# Patient Record
Sex: FEMALE | Race: OTHER | HISPANIC OR LATINO
[De-identification: names, ages, dates, MRNs, and addresses within clinical notes are randomized per-mention and may not be internally consistent; named-entity substitution may affect disease eponyms.]

---

## 2018-02-27 ENCOUNTER — APPOINTMENT (OUTPATIENT)
Dept: SURGERY | Facility: CLINIC | Age: 48
End: 2018-02-27
Payer: SELF-PAY

## 2018-02-27 VITALS
HEIGHT: 60.5 IN | SYSTOLIC BLOOD PRESSURE: 146 MMHG | DIASTOLIC BLOOD PRESSURE: 72 MMHG | WEIGHT: 216.4 LBS | BODY MASS INDEX: 41.39 KG/M2

## 2018-02-27 PROCEDURE — SI006: CPT

## 2018-04-04 ENCOUNTER — APPOINTMENT (OUTPATIENT)
Dept: SURGERY | Facility: CLINIC | Age: 48
End: 2018-04-04
Payer: COMMERCIAL

## 2018-04-04 VITALS
SYSTOLIC BLOOD PRESSURE: 120 MMHG | WEIGHT: 216 LBS | HEIGHT: 60.5 IN | DIASTOLIC BLOOD PRESSURE: 64 MMHG | BODY MASS INDEX: 41.31 KG/M2

## 2018-04-04 DIAGNOSIS — Z87.09 PERSONAL HISTORY OF OTHER DISEASES OF THE RESPIRATORY SYSTEM: ICD-10-CM

## 2018-04-04 DIAGNOSIS — Z80.41 FAMILY HISTORY OF MALIGNANT NEOPLASM OF OVARY: ICD-10-CM

## 2018-04-04 DIAGNOSIS — Z82.49 FAMILY HISTORY OF ISCHEMIC HEART DISEASE AND OTHER DISEASES OF THE CIRCULATORY SYSTEM: ICD-10-CM

## 2018-04-04 PROCEDURE — 99244 OFF/OP CNSLTJ NEW/EST MOD 40: CPT

## 2018-04-05 ENCOUNTER — APPOINTMENT (OUTPATIENT)
Dept: SURGERY | Facility: CLINIC | Age: 48
End: 2018-04-05
Payer: COMMERCIAL

## 2018-04-05 VITALS — HEIGHT: 60.5 IN | WEIGHT: 216 LBS | BODY MASS INDEX: 41.31 KG/M2

## 2018-04-05 PROCEDURE — 97802 MEDICAL NUTRITION INDIV IN: CPT

## 2018-04-05 PROCEDURE — 99212 OFFICE O/P EST SF 10 MIN: CPT

## 2018-05-10 ENCOUNTER — APPOINTMENT (OUTPATIENT)
Dept: SURGERY | Facility: CLINIC | Age: 48
End: 2018-05-10
Payer: COMMERCIAL

## 2018-05-10 VITALS — BODY MASS INDEX: 41.23 KG/M2 | WEIGHT: 215.6 LBS | HEIGHT: 60.5 IN

## 2018-05-10 PROCEDURE — 99212 OFFICE O/P EST SF 10 MIN: CPT

## 2018-06-11 ENCOUNTER — APPOINTMENT (OUTPATIENT)
Dept: SURGERY | Facility: CLINIC | Age: 48
End: 2018-06-11
Payer: COMMERCIAL

## 2018-06-11 VITALS — BODY MASS INDEX: 41.69 KG/M2 | WEIGHT: 218 LBS | HEIGHT: 60.5 IN

## 2018-06-11 PROCEDURE — 99212 OFFICE O/P EST SF 10 MIN: CPT

## 2018-06-11 RX ORDER — FLUTICASONE PROPION/SALMETEROL 500-50 MCG
BLISTER, WITH INHALATION DEVICE INHALATION TWICE DAILY
Refills: 0 | Status: DISCONTINUED | COMMUNITY
End: 2018-06-11

## 2018-07-02 ENCOUNTER — RESULT REVIEW (OUTPATIENT)
Age: 48
End: 2018-07-02

## 2018-07-10 ENCOUNTER — OUTPATIENT (OUTPATIENT)
Dept: OUTPATIENT SERVICES | Facility: HOSPITAL | Age: 48
LOS: 1 days | Discharge: HOME | End: 2018-07-10

## 2018-07-10 ENCOUNTER — APPOINTMENT (OUTPATIENT)
Dept: SURGERY | Facility: CLINIC | Age: 48
End: 2018-07-10
Payer: COMMERCIAL

## 2018-07-10 ENCOUNTER — OTHER (OUTPATIENT)
Age: 48
End: 2018-07-10

## 2018-07-10 VITALS — BODY MASS INDEX: 41.12 KG/M2 | WEIGHT: 215 LBS | HEIGHT: 60.5 IN

## 2018-07-10 PROCEDURE — 99212 OFFICE O/P EST SF 10 MIN: CPT

## 2018-07-16 ENCOUNTER — RESULT REVIEW (OUTPATIENT)
Age: 48
End: 2018-07-16

## 2018-08-07 ENCOUNTER — APPOINTMENT (OUTPATIENT)
Dept: SURGERY | Facility: CLINIC | Age: 48
End: 2018-08-07
Payer: COMMERCIAL

## 2018-08-07 VITALS — HEIGHT: 60.5 IN | BODY MASS INDEX: 41.5 KG/M2 | WEIGHT: 217 LBS

## 2018-08-07 PROCEDURE — 99212 OFFICE O/P EST SF 10 MIN: CPT

## 2018-08-16 ENCOUNTER — OUTPATIENT (OUTPATIENT)
Dept: OUTPATIENT SERVICES | Facility: HOSPITAL | Age: 48
LOS: 1 days | Discharge: HOME | End: 2018-08-16

## 2018-08-16 DIAGNOSIS — F03.90 UNSPECIFIED DEMENTIA WITHOUT BEHAVIORAL DISTURBANCE: ICD-10-CM

## 2018-08-17 DIAGNOSIS — G47.33 OBSTRUCTIVE SLEEP APNEA (ADULT) (PEDIATRIC): ICD-10-CM

## 2018-09-13 ENCOUNTER — APPOINTMENT (OUTPATIENT)
Dept: SURGERY | Facility: CLINIC | Age: 48
End: 2018-09-13
Payer: COMMERCIAL

## 2018-09-13 VITALS — BODY MASS INDEX: 42.46 KG/M2 | WEIGHT: 222 LBS | HEIGHT: 60.5 IN

## 2018-09-13 PROCEDURE — 99212 OFFICE O/P EST SF 10 MIN: CPT

## 2018-09-14 RX ORDER — ALBUTEROL SULFATE 90 UG/1
108 (90 BASE) AEROSOL, METERED RESPIRATORY (INHALATION)
Refills: 0 | Status: ACTIVE | COMMUNITY
Start: 2018-06-11

## 2018-10-08 ENCOUNTER — APPOINTMENT (OUTPATIENT)
Dept: SURGERY | Facility: CLINIC | Age: 48
End: 2018-10-08
Payer: COMMERCIAL

## 2018-10-08 VITALS — BODY MASS INDEX: 42.27 KG/M2 | HEIGHT: 60.5 IN | WEIGHT: 221 LBS

## 2018-10-08 PROCEDURE — 99212 OFFICE O/P EST SF 10 MIN: CPT

## 2018-11-12 ENCOUNTER — RX RENEWAL (OUTPATIENT)
Age: 48
End: 2018-11-12

## 2018-11-30 ENCOUNTER — OUTPATIENT (OUTPATIENT)
Dept: OUTPATIENT SERVICES | Facility: HOSPITAL | Age: 48
LOS: 1 days | Discharge: HOME | End: 2018-11-30

## 2018-11-30 VITALS
RESPIRATION RATE: 16 BRPM | DIASTOLIC BLOOD PRESSURE: 56 MMHG | TEMPERATURE: 97 F | HEART RATE: 63 BPM | HEIGHT: 60 IN | OXYGEN SATURATION: 98 % | SYSTOLIC BLOOD PRESSURE: 117 MMHG | WEIGHT: 220.46 LBS

## 2018-11-30 DIAGNOSIS — Z98.51 TUBAL LIGATION STATUS: Chronic | ICD-10-CM

## 2018-11-30 DIAGNOSIS — Z01.818 ENCOUNTER FOR OTHER PREPROCEDURAL EXAMINATION: ICD-10-CM

## 2018-11-30 DIAGNOSIS — Z90.710 ACQUIRED ABSENCE OF BOTH CERVIX AND UTERUS: Chronic | ICD-10-CM

## 2018-11-30 DIAGNOSIS — E66.01 MORBID (SEVERE) OBESITY DUE TO EXCESS CALORIES: ICD-10-CM

## 2018-11-30 LAB
ALBUMIN SERPL ELPH-MCNC: 4.4 G/DL — SIGNIFICANT CHANGE UP (ref 3.5–5.2)
ALP SERPL-CCNC: 77 U/L — SIGNIFICANT CHANGE UP (ref 30–115)
ALT FLD-CCNC: 15 U/L — SIGNIFICANT CHANGE UP (ref 0–41)
ANION GAP SERPL CALC-SCNC: 14 MMOL/L — SIGNIFICANT CHANGE UP (ref 7–14)
APPEARANCE UR: ABNORMAL
APTT BLD: 31.2 SEC — SIGNIFICANT CHANGE UP (ref 27–39.2)
AST SERPL-CCNC: 16 U/L — SIGNIFICANT CHANGE UP (ref 0–41)
BASOPHILS # BLD AUTO: 0.02 K/UL — SIGNIFICANT CHANGE UP (ref 0–0.2)
BASOPHILS NFR BLD AUTO: 0.3 % — SIGNIFICANT CHANGE UP (ref 0–1)
BILIRUB SERPL-MCNC: 0.2 MG/DL — SIGNIFICANT CHANGE UP (ref 0.2–1.2)
BILIRUB UR-MCNC: NEGATIVE — SIGNIFICANT CHANGE UP
BUN SERPL-MCNC: 10 MG/DL — SIGNIFICANT CHANGE UP (ref 10–20)
CALCIUM SERPL-MCNC: 9.4 MG/DL — SIGNIFICANT CHANGE UP (ref 8.5–10.1)
CHLORIDE SERPL-SCNC: 102 MMOL/L — SIGNIFICANT CHANGE UP (ref 98–110)
CO2 SERPL-SCNC: 27 MMOL/L — SIGNIFICANT CHANGE UP (ref 17–32)
COLOR SPEC: YELLOW — SIGNIFICANT CHANGE UP
CREAT SERPL-MCNC: 0.8 MG/DL — SIGNIFICANT CHANGE UP (ref 0.7–1.5)
DIFF PNL FLD: NEGATIVE — SIGNIFICANT CHANGE UP
EOSINOPHIL # BLD AUTO: 0.11 K/UL — SIGNIFICANT CHANGE UP (ref 0–0.7)
EOSINOPHIL NFR BLD AUTO: 1.8 % — SIGNIFICANT CHANGE UP (ref 0–8)
ESTIMATED AVERAGE GLUCOSE: 103 MG/DL — SIGNIFICANT CHANGE UP (ref 68–114)
GLUCOSE SERPL-MCNC: 90 MG/DL — SIGNIFICANT CHANGE UP (ref 70–99)
GLUCOSE UR QL: NEGATIVE — SIGNIFICANT CHANGE UP
HBA1C BLD-MCNC: 5.2 % — SIGNIFICANT CHANGE UP (ref 4–5.6)
HCT VFR BLD CALC: 35.7 % — LOW (ref 37–47)
HGB BLD-MCNC: 11.9 G/DL — LOW (ref 12–16)
IMM GRANULOCYTES NFR BLD AUTO: 0.3 % — SIGNIFICANT CHANGE UP (ref 0.1–0.3)
INR BLD: 0.99 RATIO — SIGNIFICANT CHANGE UP (ref 0.65–1.3)
KETONES UR-MCNC: NEGATIVE — SIGNIFICANT CHANGE UP
LEUKOCYTE ESTERASE UR-ACNC: NEGATIVE — SIGNIFICANT CHANGE UP
LYMPHOCYTES # BLD AUTO: 1.33 K/UL — SIGNIFICANT CHANGE UP (ref 1.2–3.4)
LYMPHOCYTES # BLD AUTO: 21.8 % — SIGNIFICANT CHANGE UP (ref 20.5–51.1)
MCHC RBC-ENTMCNC: 29.4 PG — SIGNIFICANT CHANGE UP (ref 27–31)
MCHC RBC-ENTMCNC: 33.3 G/DL — SIGNIFICANT CHANGE UP (ref 32–37)
MCV RBC AUTO: 88.1 FL — SIGNIFICANT CHANGE UP (ref 81–99)
MONOCYTES # BLD AUTO: 0.36 K/UL — SIGNIFICANT CHANGE UP (ref 0.1–0.6)
MONOCYTES NFR BLD AUTO: 5.9 % — SIGNIFICANT CHANGE UP (ref 1.7–9.3)
NEUTROPHILS # BLD AUTO: 4.26 K/UL — SIGNIFICANT CHANGE UP (ref 1.4–6.5)
NEUTROPHILS NFR BLD AUTO: 69.9 % — SIGNIFICANT CHANGE UP (ref 42.2–75.2)
NITRITE UR-MCNC: NEGATIVE — SIGNIFICANT CHANGE UP
PH UR: 5 — SIGNIFICANT CHANGE UP (ref 5–8)
PLATELET # BLD AUTO: 293 K/UL — SIGNIFICANT CHANGE UP (ref 130–400)
POTASSIUM SERPL-MCNC: 4.6 MMOL/L — SIGNIFICANT CHANGE UP (ref 3.5–5)
POTASSIUM SERPL-SCNC: 4.6 MMOL/L — SIGNIFICANT CHANGE UP (ref 3.5–5)
PROT SERPL-MCNC: 6.6 G/DL — SIGNIFICANT CHANGE UP (ref 6–8)
PROT UR-MCNC: NEGATIVE — SIGNIFICANT CHANGE UP
PROTHROM AB SERPL-ACNC: 11.4 SEC — SIGNIFICANT CHANGE UP (ref 9.95–12.87)
RBC # BLD: 4.05 M/UL — LOW (ref 4.2–5.4)
RBC # FLD: 12.8 % — SIGNIFICANT CHANGE UP (ref 11.5–14.5)
SODIUM SERPL-SCNC: 143 MMOL/L — SIGNIFICANT CHANGE UP (ref 135–146)
SP GR SPEC: >=1.03 — SIGNIFICANT CHANGE UP (ref 1.01–1.03)
UROBILINOGEN FLD QL: 0.2 — SIGNIFICANT CHANGE UP (ref 0.2–0.2)
WBC # BLD: 6.1 K/UL — SIGNIFICANT CHANGE UP (ref 4.8–10.8)
WBC # FLD AUTO: 6.1 K/UL — SIGNIFICANT CHANGE UP (ref 4.8–10.8)

## 2018-11-30 NOTE — H&P PST ADULT - HISTORY OF PRESENT ILLNESS
48 yr old female with Hx of obesity. PT had failed numerous weight loss  attempts. She is electing to have laparoscopic sleeve gastrectomy possible open possible intraoperative endoscopy  PT denies any current CP, SOB, Palpitations, or Dysuria  PT can climb 1 FOS with no SOB  PT denies PEGGY    PT states this is a complete medical and surgical assessment including prescribed and OTC medications

## 2018-11-30 NOTE — H&P PST ADULT - NSANTHOSAYNRD_GEN_A_CORE
No. PEGGY screening performed.  STOP BANG Legend: 0-2 = LOW Risk; 3-4 = INTERMEDIATE Risk; 5-8 = HIGH Risk

## 2018-12-01 LAB
CULTURE RESULTS: NO GROWTH — SIGNIFICANT CHANGE UP
SPECIMEN SOURCE: SIGNIFICANT CHANGE UP

## 2018-12-03 ENCOUNTER — OTHER (OUTPATIENT)
Age: 48
End: 2018-12-03

## 2018-12-05 ENCOUNTER — APPOINTMENT (OUTPATIENT)
Dept: SURGERY | Facility: CLINIC | Age: 48
End: 2018-12-05
Payer: COMMERCIAL

## 2018-12-05 VITALS
WEIGHT: 222 LBS | DIASTOLIC BLOOD PRESSURE: 76 MMHG | HEIGHT: 60.5 IN | BODY MASS INDEX: 42.46 KG/M2 | SYSTOLIC BLOOD PRESSURE: 124 MMHG

## 2018-12-05 DIAGNOSIS — Z87.19 PERSONAL HISTORY OF OTHER DISEASES OF THE DIGESTIVE SYSTEM: ICD-10-CM

## 2018-12-05 PROCEDURE — 99215 OFFICE O/P EST HI 40 MIN: CPT

## 2018-12-05 RX ORDER — IBUPROFEN 200 MG/1
200 TABLET, COATED ORAL AS DIRECTED
Refills: 0 | Status: DISCONTINUED | COMMUNITY
End: 2018-12-05

## 2018-12-11 ENCOUNTER — RESULT REVIEW (OUTPATIENT)
Age: 48
End: 2018-12-11

## 2018-12-11 ENCOUNTER — INPATIENT (INPATIENT)
Facility: HOSPITAL | Age: 48
LOS: 0 days | Discharge: HOME | End: 2018-12-12
Attending: SURGERY | Admitting: SURGERY
Payer: COMMERCIAL

## 2018-12-11 ENCOUNTER — APPOINTMENT (OUTPATIENT)
Dept: SURGERY | Facility: HOSPITAL | Age: 48
End: 2018-12-11
Payer: COMMERCIAL

## 2018-12-11 VITALS
WEIGHT: 220.02 LBS | SYSTOLIC BLOOD PRESSURE: 139 MMHG | HEART RATE: 66 BPM | HEIGHT: 61 IN | DIASTOLIC BLOOD PRESSURE: 87 MMHG | TEMPERATURE: 99 F | RESPIRATION RATE: 18 BRPM

## 2018-12-11 DIAGNOSIS — Z90.710 ACQUIRED ABSENCE OF BOTH CERVIX AND UTERUS: Chronic | ICD-10-CM

## 2018-12-11 DIAGNOSIS — Z98.51 TUBAL LIGATION STATUS: Chronic | ICD-10-CM

## 2018-12-11 LAB — GLUCOSE BLDC GLUCOMTR-MCNC: 128 MG/DL — HIGH (ref 70–99)

## 2018-12-11 PROCEDURE — 99223 1ST HOSP IP/OBS HIGH 75: CPT

## 2018-12-11 PROCEDURE — 43775 LAP SLEEVE GASTRECTOMY: CPT

## 2018-12-11 PROCEDURE — 49659 UNLSTD LAPS PX HRNAP HRNRPHY: CPT

## 2018-12-11 RX ORDER — PROCHLORPERAZINE MALEATE 5 MG
10 TABLET ORAL EVERY 6 HOURS
Qty: 0 | Refills: 0 | Status: DISCONTINUED | OUTPATIENT
Start: 2018-12-11 | End: 2018-12-11

## 2018-12-11 RX ORDER — ONDANSETRON 8 MG/1
4 TABLET, FILM COATED ORAL EVERY 8 HOURS
Qty: 0 | Refills: 0 | Status: DISCONTINUED | OUTPATIENT
Start: 2018-12-11 | End: 2018-12-12

## 2018-12-11 RX ORDER — PANTOPRAZOLE SODIUM 20 MG/1
40 TABLET, DELAYED RELEASE ORAL EVERY 24 HOURS
Qty: 0 | Refills: 0 | Status: DISCONTINUED | OUTPATIENT
Start: 2018-12-11 | End: 2018-12-12

## 2018-12-11 RX ORDER — MORPHINE SULFATE 50 MG/1
30 CAPSULE, EXTENDED RELEASE ORAL
Qty: 0 | Refills: 0 | Status: DISCONTINUED | OUTPATIENT
Start: 2018-12-11 | End: 2018-12-12

## 2018-12-11 RX ORDER — SODIUM CHLORIDE 9 MG/ML
1000 INJECTION, SOLUTION INTRAVENOUS
Qty: 0 | Refills: 0 | Status: DISCONTINUED | OUTPATIENT
Start: 2018-12-11 | End: 2018-12-11

## 2018-12-11 RX ORDER — NALOXONE HYDROCHLORIDE 4 MG/.1ML
0.1 SPRAY NASAL
Qty: 0 | Refills: 0 | Status: DISCONTINUED | OUTPATIENT
Start: 2018-12-11 | End: 2018-12-12

## 2018-12-11 RX ORDER — CEFOTETAN DISODIUM 1 G
2 VIAL (EA) INJECTION EVERY 12 HOURS
Qty: 0 | Refills: 0 | Status: COMPLETED | OUTPATIENT
Start: 2018-12-11 | End: 2018-12-12

## 2018-12-11 RX ORDER — HEPARIN SODIUM 5000 [USP'U]/ML
5000 INJECTION INTRAVENOUS; SUBCUTANEOUS EVERY 8 HOURS
Qty: 0 | Refills: 0 | Status: DISCONTINUED | OUTPATIENT
Start: 2018-12-11 | End: 2018-12-12

## 2018-12-11 RX ORDER — SODIUM CHLORIDE 9 MG/ML
1000 INJECTION, SOLUTION INTRAVENOUS
Qty: 0 | Refills: 0 | Status: DISCONTINUED | OUTPATIENT
Start: 2018-12-11 | End: 2018-12-12

## 2018-12-11 RX ORDER — MORPHINE SULFATE 50 MG/1
2 CAPSULE, EXTENDED RELEASE ORAL
Qty: 0 | Refills: 0 | Status: DISCONTINUED | OUTPATIENT
Start: 2018-12-11 | End: 2018-12-12

## 2018-12-11 RX ORDER — ONDANSETRON 8 MG/1
4 TABLET, FILM COATED ORAL ONCE
Qty: 0 | Refills: 0 | Status: DISCONTINUED | OUTPATIENT
Start: 2018-12-11 | End: 2018-12-12

## 2018-12-11 RX ORDER — ONDANSETRON 8 MG/1
4 TABLET, FILM COATED ORAL EVERY 6 HOURS
Qty: 0 | Refills: 0 | Status: DISCONTINUED | OUTPATIENT
Start: 2018-12-11 | End: 2018-12-12

## 2018-12-11 RX ORDER — PROCHLORPERAZINE MALEATE 5 MG
10 TABLET ORAL EVERY 6 HOURS
Qty: 0 | Refills: 0 | Status: COMPLETED | OUTPATIENT
Start: 2018-12-11 | End: 2018-12-11

## 2018-12-11 RX ORDER — HYDROMORPHONE HYDROCHLORIDE 2 MG/ML
0.5 INJECTION INTRAMUSCULAR; INTRAVENOUS; SUBCUTANEOUS
Qty: 0 | Refills: 0 | Status: DISCONTINUED | OUTPATIENT
Start: 2018-12-11 | End: 2018-12-12

## 2018-12-11 RX ADMIN — Medication 10 MILLIGRAM(S): at 23:51

## 2018-12-11 RX ADMIN — ONDANSETRON 4 MILLIGRAM(S): 8 TABLET, FILM COATED ORAL at 15:15

## 2018-12-11 RX ADMIN — ONDANSETRON 4 MILLIGRAM(S): 8 TABLET, FILM COATED ORAL at 21:21

## 2018-12-11 RX ADMIN — SODIUM CHLORIDE 125 MILLILITER(S): 9 INJECTION, SOLUTION INTRAVENOUS at 16:02

## 2018-12-11 RX ADMIN — Medication 10 MILLIGRAM(S): at 16:01

## 2018-12-11 RX ADMIN — PANTOPRAZOLE SODIUM 40 MILLIGRAM(S): 20 TABLET, DELAYED RELEASE ORAL at 16:40

## 2018-12-11 RX ADMIN — MORPHINE SULFATE 30 MILLILITER(S): 50 CAPSULE, EXTENDED RELEASE ORAL at 17:15

## 2018-12-11 RX ADMIN — Medication 100 GRAM(S): at 18:45

## 2018-12-11 RX ADMIN — HYDROMORPHONE HYDROCHLORIDE 0.5 MILLIGRAM(S): 2 INJECTION INTRAMUSCULAR; INTRAVENOUS; SUBCUTANEOUS at 15:55

## 2018-12-11 RX ADMIN — HYDROMORPHONE HYDROCHLORIDE 0.5 MILLIGRAM(S): 2 INJECTION INTRAMUSCULAR; INTRAVENOUS; SUBCUTANEOUS at 16:15

## 2018-12-11 RX ADMIN — HEPARIN SODIUM 5000 UNIT(S): 5000 INJECTION INTRAVENOUS; SUBCUTANEOUS at 21:22

## 2018-12-11 NOTE — CONSULT NOTE ADULT - ASSESSMENT
ASSESSMENT/PLAN: 48yFemale PMHx of GERD, PEGGY, Asthma s/p laprascopic sleeve gastrectomy with hiatal hernia repair      Neurologic: Monitor for changes in mental status. Pain control with Morphine PCA pump     Respiratory: NC satting well. Plan to wean to room air once patient tolerates.     Cardiovascular: No dx. Keep normotensive. MAP >65.      Gastrointestinal/Nutrition: NPO for now. Advance to bariatric phase I diet tomorrow. Zofran PRN for N/V. Patient had persistent N/V, compezine was ordered. PPI ppx.     Genitourinary/Renal: No vargas. Pending trial of void. Monitor lytes and replete as needed.     Hematologic: Heparin Sub Q 5000 Q 8. SCD in place. Monitor H/H     Infectious Disease: ree op ancef. Monitor for fever/chills.     Endocrine: FS    Disposition: SICU

## 2018-12-11 NOTE — CONSULT NOTE ADULT - SUBJECTIVE AND OBJECTIVE BOX
SICU Consultation Note  =====================================================  HPI:   48y female w/ PMHx of GERD, PEGGY, BMI of 41 s/p laparoscopic sleeve gastrectomy with hiatal hernia repair. Case was uneventful. Patietn was extubated successfully post op. Will monitor in the SICU overnight. Patient is hemodynamically stable for transfer to the SICU.     Surgery Information  OR time: 2 hours     EBL: 5cc      UO:  No vargas           IV Fluids:  1300 cc           PAST MEDICAL & SURGICAL HISTORY:  GERD (gastroesophageal reflux disease  PEGGY, not on CPAP   H/O tubal ligation: also had IUD embedded that was removed  H/O abdominal hysterectomy: 06/14/2018      Allergies  No Known Allergies    Intolerances      SH:  Home Medications:  omeprazole 20 mg oral delayed release capsule: 1 cap(s) orally once a day, As Needed (11 Dec 2018 10:07)    Advanced Directives: Full Code     ROS:  [x ] A ten-point review of systems was otherwise negative except as noted.        CURRENT MEDICATIONS:   --------------------------------------------------------------------------------------  Neurologic Medications  HYDROmorphone  Injectable 0.5 milliGRAM(s) IV Push every 10 minutes PRN Moderate Pain (4 - 6)  morphine  - Injectable 2 milliGRAM(s) IV Push every 10 minutes PRN Severe Pain (7 - 10)  morphine PCA (5 mG/mL) 30 milliLiter(s) PCA Continuous PCA Continuous  ondansetron Injectable 4 milliGRAM(s) IV Push every 6 hours PRN Nausea  ondansetron Injectable 4 milliGRAM(s) IV Push every 8 hours  ondansetron Injectable 4 milliGRAM(s) IV Push once  prochlorperazine   Injectable 10 milliGRAM(s) IV Push every 6 hours PRN nasuea    Respiratory Medications    Cardiovascular Medications    Gastrointestinal Medications  lactated ringers. 1000 milliLiter(s) IV Continuous <Continuous>  pantoprazole  Injectable 40 milliGRAM(s) IV Push every 24 hours    Genitourinary Medications    Hematologic/Oncologic Medications  heparin  Injectable 5000 Unit(s) SubCutaneous every 8 hours    Antimicrobial/Immunologic Medications  cefoTEtan  IVPB 2 Gram(s) IV Intermittent every 12 hours    Endocrine/Metabolic Medications    Topical/Other Medications  naloxone Injectable 0.1 milliGRAM(s) IV Push every 3 minutes PRN For ANY of the following changes in patient status:  A. RR LESS THAN 10 breaths per minute, B. Oxygen saturation LESS THAN 90%, C. Sedation score of 6    --------------------------------------------------------------------------------------    Vital Signs Last 24 Hrs  T(C): 36.3 (11 Dec 2018 14:59), Max: 37.1 (11 Dec 2018 10:08)  T(F): 97.4 (11 Dec 2018 14:59), Max: 98.7 (11 Dec 2018 10:08)  HR: 67 (11 Dec 2018 15:30) (54 - 76)  BP: 128/71 (11 Dec 2018 15:30) (128/71 - 170/84)  BP(mean): --  RR: 21 (11 Dec 2018 15:30) (16 - 26)  SpO2: 99% (11 Dec 2018 15:30) (99% - 99%)  I&O's Detail    I&O's Summary                EXAM:  General/Neuro: A&Ox3. Follows commands. No focal deficits. PERRL.     Respiratory  Exam: Lungs clear to auscultation, Normal expansion/effort.    Cardiovascular  Exam: S1, S2.  Regular rate and rhythm.   Cardiac Rhythm: Normal Sinus Rhythm  ECHO: EF 40-50%     GI  Exam: Abdomen soft, Non-tender, Non-distended.  Gastrostomy / Jejunostomy tube in place.  Nasogastric tube in place.  Colostomy / Ileostomy.  ***  Wound:  clean, dry and intact   Current Diet:  NP    Extremities  Exam: Extremities warm, pink, well-perfused.   Peripheral edema    Derm:  Exam: Good skin turgor, no skin breakdown.      :   Exam: Vargas catheter in place.     Tubes/Lines/Drains  ***  [x] Peripheral IV  [] Central Venous Line     	[] R	[] L	[] IJ	[] Fem	[] SC        Type:	    Date Placed:   [] Arterial Line		[] R	[] L	[] Fem	[] Rad	[] Ax	Date Placed:   [] PICC:         	[] Midline		[] Mediport           [] Urinary Catheter		Date Placed: SICU Consultation Note  =====================================================  HPI:   48y female w/ PMHx of GERD, PEGGY, BMI of 41 s/p laparoscopic sleeve gastrectomy with hiatal hernia repair. Case was uneventful. Patietn was extubated successfully post op. Will monitor in the SICU overnight. Patient is hemodynamically stable for transfer to the SICU.     Surgery Information  OR time: 2 hours     EBL: 5cc      UO:  No vargas           IV Fluids:  1300 cc           PAST MEDICAL & SURGICAL HISTORY:  GERD (gastroesophageal reflux disease  PEGGY, not on CPAP   H/O tubal ligation: also had IUD embedded that was removed  H/O abdominal hysterectomy: 06/14/2018      Allergies  No Known Allergies    Intolerances      SH:  Home Medications:  omeprazole 20 mg oral delayed release capsule: 1 cap(s) orally once a day, As Needed (11 Dec 2018 10:07)    Advanced Directives: Full Code     ROS:  [x ] A ten-point review of systems was otherwise negative except as noted.        CURRENT MEDICATIONS:   --------------------------------------------------------------------------------------  Neurologic Medications  HYDROmorphone  Injectable 0.5 milliGRAM(s) IV Push every 10 minutes PRN Moderate Pain (4 - 6)  morphine  - Injectable 2 milliGRAM(s) IV Push every 10 minutes PRN Severe Pain (7 - 10)  morphine PCA (5 mG/mL) 30 milliLiter(s) PCA Continuous PCA Continuous  ondansetron Injectable 4 milliGRAM(s) IV Push every 6 hours PRN Nausea  ondansetron Injectable 4 milliGRAM(s) IV Push every 8 hours  ondansetron Injectable 4 milliGRAM(s) IV Push once  prochlorperazine   Injectable 10 milliGRAM(s) IV Push every 6 hours PRN nasuea    Respiratory Medications    Cardiovascular Medications    Gastrointestinal Medications  lactated ringers. 1000 milliLiter(s) IV Continuous <Continuous>  pantoprazole  Injectable 40 milliGRAM(s) IV Push every 24 hours    Genitourinary Medications    Hematologic/Oncologic Medications  heparin  Injectable 5000 Unit(s) SubCutaneous every 8 hours    Antimicrobial/Immunologic Medications  cefoTEtan  IVPB 2 Gram(s) IV Intermittent every 12 hours    Endocrine/Metabolic Medications    Topical/Other Medications  naloxone Injectable 0.1 milliGRAM(s) IV Push every 3 minutes PRN For ANY of the following changes in patient status:  A. RR LESS THAN 10 breaths per minute, B. Oxygen saturation LESS THAN 90%, C. Sedation score of 6    --------------------------------------------------------------------------------------    Vital Signs Last 24 Hrs  T(C): 36.3 (11 Dec 2018 14:59), Max: 37.1 (11 Dec 2018 10:08)  T(F): 97.4 (11 Dec 2018 14:59), Max: 98.7 (11 Dec 2018 10:08)  HR: 67 (11 Dec 2018 15:30) (54 - 76)  BP: 128/71 (11 Dec 2018 15:30) (128/71 - 170/84)  BP(mean): --  RR: 21 (11 Dec 2018 15:30) (16 - 26)  SpO2: 99% (11 Dec 2018 15:30) (99% - 99%)  I&O's Detail    I&O's Summary                EXAM:  General/Neuro: A&Ox3. Follows commands. No focal deficits. PERRL.     Respiratory  Exam: Lungs clear to auscultation, Normal expansion/effort.    Cardiovascular  Exam: S1, S2.  Regular rate and rhythm.   Cardiac Rhythm: Normal Sinus Rhythm  ECHO: EF 40-50%     GI  Exam: Abdomen soft, Non-tender, Non-distended.   Wound:  clean, dry and intact   Current Diet:  NPO    Extremities  Exam: Extremities warm, pink, well-perfused. Pulses palpated throughout.     Derm:  Exam: Good skin turgor, no skin breakdown.      :   Exam: No vargas in place.     Tubes/Lines/Drains  ***  [x] Peripheral IV

## 2018-12-11 NOTE — CHART NOTE - NSCHARTNOTEFT_GEN_A_CORE
PACU ANESTHESIA ADMISSION NOTE      Procedure: Laparoscopic sleeve gastrectomy with laparoscopic repair of hiatal hernia    Post op diagnosis:  Morbid obesity due to excess calories      ____  Intubated  TV:______       Rate: ______      FiO2: ______    _x___  Patent Airway    __x__  Full return of protective reflexes    __x__  Full recovery from anesthesia / back to baseline     Vitals:   T:  97.4         R16                  BP:170/84                  Sat: 99                  P: 74      Mental Status:  _x___ Awake  x _____ Alert   _____ Drowsy   _____ Sedated    Nausea/Vomiting:  x____ NO  ______Yes,x   See Post - Op Orders          Pain Scale (0-10):  _0____    Treatment: ____ None    x____ See Post - Op/PCA Orders    Post - Operative Fluids:   ____ Oral   ____ See Post - Op Orders    Plan: Discharge:   ____Home       _____Floor     x_____Critical Care    _____  Other:_________________    Comments:

## 2018-12-11 NOTE — CHART NOTE - NSCHARTNOTEFT_GEN_A_CORE
POST-OP CHECK    PROCEDURE: S/P laparoscopic sleeve gastrectomy with laparoscopic repair of hiatal hernia.    S: Patient is awake and alert, resting comfortably in bed.  Pain is controlled on PCA pump.  Patient reported some nausea; controlled by 1 dose zofran 4mg @1550 and 1 dose compazine 10mg 1600.  No vomiting.  Patient is hemodynamically stable.  Not OOB yet, to be up out of bed to chair now, spoke with staff.  No void yet.      O:   T(C): 36.4 (12-11-18 @ 18:15), Max: 36.4 (12-11-18 @ 18:15)  HR: 90 (12-11-18 @ 19:15) (56 - 90)  BP: 147/83 (12-11-18 @ 19:15) (122/73 - 147/83)  RR: 17 (12-11-18 @ 19:15) (15 - 25)  SpO2: 98% (12-11-18 @ 19:15) (91% - 99%)  I&O's Summary      PHYSICAL EXAM:    Gen: NAD  CV: s1s2+, RRR  Lungs: CTA bilaterally  Abd: soft, nontender, nondistended; obese.  Laparoscopic sites clean dry and intact.  Ext: SCD's in place, no edema.     cefoTEtan  IVPB Gram(s) IV Intermittent every 12 hours  heparin  Injectable Unit(s) SubCutaneous every 8 hours  HYDROmorphone  Injectable milliGRAM(s) IV Push every 10 minutes PRN  lactated ringers. milliLiter(s) IV Continuous <Continuous>  morphine  - Injectable milliGRAM(s) IV Push every 10 minutes PRN  morphine PCA (5 mG/mL) milliLiter(s) PCA Continuous PCA Continuous  naloxone Injectable milliGRAM(s) IV Push every 3 minutes PRN  ondansetron Injectable milliGRAM(s) IV Push every 6 hours PRN  ondansetron Injectable milliGRAM(s) IV Push every 8 hours  ondansetron Injectable milliGRAM(s) IV Push once  pantoprazole  Injectable milliGRAM(s) IV Push every 24 hours  prochlorperazine   Injectable milliGRAM(s) IV Push every 6 hours PRN        A/P:   48 year old female s/p laparoscopic sleeve gastrectomy with laparoscopic repair of hiatal hernia; doing well postoperatively.  -Stable and afebrile  -Nausea is controlled  -No vomiting  -Continue post-op ABx (incisional)  -IV fluids  -Pain control with PCA  -Ambulation with assistance.  -Monitor for void      SPECTRA:  [id number]  Please call with any questions regarding this patient. POST-OP CHECK    PROCEDURE: S/P laparoscopic sleeve gastrectomy with laparoscopic repair of hiatal hernia.    S: Patient is awake and alert, resting comfortably in bed.  Pain is controlled on PCA pump.  Patient reported some nausea; controlled by 1 dose zofran 4mg @1550 and 1 dose compazine 10mg 1600.  No vomiting.  Patient is hemodynamically stable.  Not OOB yet, to be up out of bed to chair now, spoke with staff.  No void yet.      O:   T(C): 36.4 (12-11-18 @ 18:15), Max: 36.4 (12-11-18 @ 18:15)  HR: 90 (12-11-18 @ 19:15) (56 - 90)  BP: 147/83 (12-11-18 @ 19:15) (122/73 - 147/83)  RR: 17 (12-11-18 @ 19:15) (15 - 25)  SpO2: 98% (12-11-18 @ 19:15) (91% - 99%)  I&O's Summary      PHYSICAL EXAM:    Gen: NAD  CV: s1s2+, RRR  Lungs: CTA bilaterally  Abd: soft, nontender, nondistended; obese.  Laparoscopic sites clean dry and intact.  Ext: SCD's in place, no edema.     cefoTEtan  IVPB Gram(s) IV Intermittent every 12 hours  heparin  Injectable Unit(s) SubCutaneous every 8 hours  HYDROmorphone  Injectable milliGRAM(s) IV Push every 10 minutes PRN  lactated ringers. milliLiter(s) IV Continuous <Continuous>  morphine  - Injectable milliGRAM(s) IV Push every 10 minutes PRN  morphine PCA (5 mG/mL) milliLiter(s) PCA Continuous PCA Continuous  naloxone Injectable milliGRAM(s) IV Push every 3 minutes PRN  ondansetron Injectable milliGRAM(s) IV Push every 6 hours PRN  ondansetron Injectable milliGRAM(s) IV Push every 8 hours  ondansetron Injectable milliGRAM(s) IV Push once  pantoprazole  Injectable milliGRAM(s) IV Push every 24 hours  prochlorperazine   Injectable milliGRAM(s) IV Push every 6 hours PRN        A/P:   48 year old female s/p laparoscopic sleeve gastrectomy with laparoscopic repair of hiatal hernia; doing well postoperatively.  -Stable and afebrile  -Nausea is controlled  -No vomiting  -Continue post-op ABx (incisional)  -IV fluids  -Pain control with PCA  -Ambulation with assistance.  -Monitor for void  -NPO, strict.  Will proceed with bariatric clear diet per protocol tomorrow.  -May use moist swabs for dry mouth.       SPECTRA:  [id number]  Please call with any questions regarding this patient.

## 2018-12-11 NOTE — BRIEF OPERATIVE NOTE - PROCEDURE
<<-----Click on this checkbox to enter Procedure Laparoscopic sleeve gastrectomy with laparoscopic repair of hiatal hernia  12/11/2018    Active  [id number]

## 2018-12-11 NOTE — PRE-ANESTHESIA EVALUATION ADULT - NSANTHALCOHOLSD_GEN_ALL_CORE
Consent: The patient's verbal consent was obtained including but not limited to risks of crusting, scabbing, blistering, scarring, darker or lighter pigmentary change, recurrence, incomplete removal and infection. Render Post-Care Instructions In Note?: no Post-Care Instructions: I reviewed with the patient in detail post-care instructions. Patient is to wear sunprotection, and avoid picking at any of the treated lesions.  Pt may apply Vaseline to crusted or scabbing areas and cover as needed Duration Of Freeze Thaw-Cycle (Seconds): 0 Detail Level: Detailed No

## 2018-12-12 ENCOUNTER — TRANSCRIPTION ENCOUNTER (OUTPATIENT)
Age: 48
End: 2018-12-12

## 2018-12-12 VITALS — SYSTOLIC BLOOD PRESSURE: 157 MMHG | DIASTOLIC BLOOD PRESSURE: 74 MMHG | HEART RATE: 67 BPM

## 2018-12-12 LAB
ANION GAP SERPL CALC-SCNC: 15 MMOL/L — HIGH (ref 7–14)
APTT BLD: 28.2 SEC — SIGNIFICANT CHANGE UP (ref 27–39.2)
BASOPHILS # BLD AUTO: 0.01 K/UL — SIGNIFICANT CHANGE UP (ref 0–0.2)
BASOPHILS NFR BLD AUTO: 0.1 % — SIGNIFICANT CHANGE UP (ref 0–1)
BUN SERPL-MCNC: 8 MG/DL — LOW (ref 10–20)
CALCIUM SERPL-MCNC: 9.5 MG/DL — SIGNIFICANT CHANGE UP (ref 8.5–10.1)
CHLORIDE SERPL-SCNC: 98 MMOL/L — SIGNIFICANT CHANGE UP (ref 98–110)
CO2 SERPL-SCNC: 25 MMOL/L — SIGNIFICANT CHANGE UP (ref 17–32)
CREAT SERPL-MCNC: 0.7 MG/DL — SIGNIFICANT CHANGE UP (ref 0.7–1.5)
EOSINOPHIL # BLD AUTO: 0 K/UL — SIGNIFICANT CHANGE UP (ref 0–0.7)
EOSINOPHIL NFR BLD AUTO: 0 % — SIGNIFICANT CHANGE UP (ref 0–8)
GLUCOSE BLDC GLUCOMTR-MCNC: 111 MG/DL — HIGH (ref 70–99)
GLUCOSE BLDC GLUCOMTR-MCNC: 140 MG/DL — HIGH (ref 70–99)
GLUCOSE BLDC GLUCOMTR-MCNC: 99 MG/DL — SIGNIFICANT CHANGE UP (ref 70–99)
GLUCOSE SERPL-MCNC: 135 MG/DL — HIGH (ref 70–99)
HCT VFR BLD CALC: 37 % — SIGNIFICANT CHANGE UP (ref 37–47)
HGB BLD-MCNC: 12.3 G/DL — SIGNIFICANT CHANGE UP (ref 12–16)
IMM GRANULOCYTES NFR BLD AUTO: 0.5 % — HIGH (ref 0.1–0.3)
INR BLD: 1.08 RATIO — SIGNIFICANT CHANGE UP (ref 0.65–1.3)
LYMPHOCYTES # BLD AUTO: 0.44 K/UL — LOW (ref 1.2–3.4)
LYMPHOCYTES # BLD AUTO: 3.8 % — LOW (ref 20.5–51.1)
MAGNESIUM SERPL-MCNC: 1.5 MG/DL — LOW (ref 1.8–2.4)
MCHC RBC-ENTMCNC: 29 PG — SIGNIFICANT CHANGE UP (ref 27–31)
MCHC RBC-ENTMCNC: 33.2 G/DL — SIGNIFICANT CHANGE UP (ref 32–37)
MCV RBC AUTO: 87.3 FL — SIGNIFICANT CHANGE UP (ref 81–99)
MONOCYTES # BLD AUTO: 0.24 K/UL — SIGNIFICANT CHANGE UP (ref 0.1–0.6)
MONOCYTES NFR BLD AUTO: 2.1 % — SIGNIFICANT CHANGE UP (ref 1.7–9.3)
NEUTROPHILS # BLD AUTO: 10.87 K/UL — HIGH (ref 1.4–6.5)
NEUTROPHILS NFR BLD AUTO: 93.5 % — HIGH (ref 42.2–75.2)
NRBC # BLD: 0 /100 WBCS — SIGNIFICANT CHANGE UP (ref 0–0)
PHOSPHATE SERPL-MCNC: 3 MG/DL — SIGNIFICANT CHANGE UP (ref 2.1–4.9)
PLATELET # BLD AUTO: 287 K/UL — SIGNIFICANT CHANGE UP (ref 130–400)
POTASSIUM SERPL-MCNC: 4.2 MMOL/L — SIGNIFICANT CHANGE UP (ref 3.5–5)
POTASSIUM SERPL-SCNC: 4.2 MMOL/L — SIGNIFICANT CHANGE UP (ref 3.5–5)
PROTHROM AB SERPL-ACNC: 12.4 SEC — SIGNIFICANT CHANGE UP (ref 9.95–12.87)
RBC # BLD: 4.24 M/UL — SIGNIFICANT CHANGE UP (ref 4.2–5.4)
RBC # FLD: 12.5 % — SIGNIFICANT CHANGE UP (ref 11.5–14.5)
SODIUM SERPL-SCNC: 138 MMOL/L — SIGNIFICANT CHANGE UP (ref 135–146)
WBC # BLD: 11.62 K/UL — HIGH (ref 4.8–10.8)
WBC # FLD AUTO: 11.62 K/UL — HIGH (ref 4.8–10.8)

## 2018-12-12 PROCEDURE — 99232 SBSQ HOSP IP/OBS MODERATE 35: CPT

## 2018-12-12 RX ORDER — MAGNESIUM SULFATE 500 MG/ML
2 VIAL (ML) INJECTION ONCE
Qty: 0 | Refills: 0 | Status: COMPLETED | OUTPATIENT
Start: 2018-12-12 | End: 2018-12-12

## 2018-12-12 RX ORDER — OMEPRAZOLE 10 MG/1
1 CAPSULE, DELAYED RELEASE ORAL
Qty: 0 | Refills: 0 | COMMUNITY

## 2018-12-12 RX ADMIN — PANTOPRAZOLE SODIUM 40 MILLIGRAM(S): 20 TABLET, DELAYED RELEASE ORAL at 17:50

## 2018-12-12 RX ADMIN — HEPARIN SODIUM 5000 UNIT(S): 5000 INJECTION INTRAVENOUS; SUBCUTANEOUS at 13:22

## 2018-12-12 RX ADMIN — Medication 50 GRAM(S): at 04:10

## 2018-12-12 RX ADMIN — SODIUM CHLORIDE 125 MILLILITER(S): 9 INJECTION, SOLUTION INTRAVENOUS at 08:15

## 2018-12-12 RX ADMIN — ONDANSETRON 4 MILLIGRAM(S): 8 TABLET, FILM COATED ORAL at 13:21

## 2018-12-12 RX ADMIN — ONDANSETRON 4 MILLIGRAM(S): 8 TABLET, FILM COATED ORAL at 05:37

## 2018-12-12 RX ADMIN — SODIUM CHLORIDE 125 MILLILITER(S): 9 INJECTION, SOLUTION INTRAVENOUS at 00:30

## 2018-12-12 RX ADMIN — HEPARIN SODIUM 5000 UNIT(S): 5000 INJECTION INTRAVENOUS; SUBCUTANEOUS at 05:51

## 2018-12-12 RX ADMIN — Medication 100 GRAM(S): at 05:53

## 2018-12-12 NOTE — DISCHARGE NOTE ADULT - CARE PROVIDER_API CALL
Ivania Bermeo), Surgery  [address] [place] [address]  [address] [place] [address]  Phone: [phone]  Fax: [phone]

## 2018-12-12 NOTE — CHART NOTE - NSCHARTNOTEFT_GEN_A_CORE
ASSESSMENT/PLAN: 48yFemale PMHx of GERD, PEGGY, Asthma s/p laprascopic sleeve gastrectomy with hiatal hernia repair BMI 41. No acute events over night.      Neurologic: Monitor for changes in mental status. Pain control with Morphine PCA pump     Respiratory: On room air saturating well.     Cardiovascular: No dx. Keep normotensive. MAP >65.      Gastrointestinal/Nutrition: Bariatric phase I diet. Zofran PRN for N/V. PPI ppx.     Genitourinary/Renal: No Lazaro, Passed trial of void. Monitor lytes and replete as needed.     Hematologic: Heparin Sub Q 5000 Q 8. SCD in place. Monitor H/H     Infectious Disease: ree op ancef. Monitor for fever/chills.     Endocrine: FS    DVT PTX: Heparin Sub Q. SCD   GI PTX: PPI IV      F/U:  -PO intake  -Midnight labs      Signed out to ASSESSMENT/PLAN: 48yFemale PMHx of GERD, PEGGY, Asthma s/p laprascopic sleeve gastrectomy with hiatal hernia repair BMI 41. No acute events over night.      Neurologic: Monitor for changes in mental status. Pain control with Morphine PCA pump     Respiratory: On room air saturating well.     Cardiovascular: No dx. Keep normotensive. MAP >65.      Gastrointestinal/Nutrition: Bariatric phase I diet. Zofran PRN for N/V. PPI ppx.     Genitourinary/Renal: No Lazaro, Passed trial of void. Monitor lytes and replete as needed.     Hematologic: Heparin Sub Q 5000 Q 8. SCD in place. Monitor H/H     Infectious Disease: ree op ancef. Monitor for fever/chills.     Endocrine: FS    DVT PTX: Heparin Sub Q. SCD   GI PTX: PPI IV      F/U:  -PO intake  -Midnight labs      Signed out to MD Max.  12/12/18 @ [id number]

## 2018-12-12 NOTE — DISCHARGE NOTE ADULT - CARE PLAN
Principal Discharge DX:	Obesity with serious comorbidity, unspecified classification, unspecified obesity type  Goal:	Full recovery  Assessment and plan of treatment:	S/p laparoscopic sleeve gastrectomy

## 2018-12-12 NOTE — DISCHARGE NOTE ADULT - PATIENT PORTAL LINK FT
You can access the [facility] Patient Portal, offered by [facility], by registering with the following website: [email or web address]

## 2018-12-12 NOTE — PROGRESS NOTE ADULT - ASSESSMENT
ASSESSMENT/PLAN: 48yFemale PMHx of GERD, PEGGY, Asthma s/p laprascopic sleeve gastrectomy with hiatal hernia repair      Neurologic: Monitor for changes in mental status. Pain control with Morphine PCA pump     Respiratory: NC satting well. Plan to wean to room air once patient tolerates.     Cardiovascular: No dx. Keep normotensive. MAP >65.      Gastrointestinal/Nutrition: NPO for now. Advance to bariatric phase I diet tomorrow. Zofran PRN for N/V. Patient had persistent N/V, compezine was ordered. PPI ppx.     Genitourinary/Renal: No vargas. Pending trial of void. Monitor lytes and replete as needed.     Hematologic: Heparin Sub Q 5000 Q 8. SCD in place. Monitor H/H     Infectious Disease: ree op ancef. Monitor for fever/chills.     Endocrine: FS    Disposition: SICU ASSESSMENT/PLAN: 48yFemale PMHx of GERD, PEGGY, Asthma s/p laprascopic sleeve gastrectomy with hiatal hernia repair      Neurologic: Monitor for changes in mental status. Pain control with Morphine PCA pump     Respiratory: On room air saturating well.     Cardiovascular: No dx. Keep normotensive. MAP >65.      Gastrointestinal/Nutrition: Bariatric phase I diet. Zofran PRN for N/V. PPI ppx.     Genitourinary/Renal: No Lazaro, Passed trial of void. Monitor lytes and replete as needed.     Hematologic: Heparin Sub Q 5000 Q 8. SCD in place. Monitor H/H     Infectious Disease: ree op ancef. Monitor for fever/chills.     Endocrine: FS    Disposition: SICU

## 2018-12-12 NOTE — PROGRESS NOTE ADULT - SUBJECTIVE AND OBJECTIVE BOX
GENERAL SURGERY PROGRESS NOTE     GRACIA DIAZ  [address] [place] [id number] day :1d  POD:  Procedure: Laparoscopic sleeve gastrectomy with laparoscopic repair of hiatal hernia    Surgical Attending: Ivania Bermeo  Overnight events: No acute events overnight. Nausea controlled with zofran, compazine. Feels pain well controlled with PCA. Has ambulated regularly around PACU. Pulling 1500 on IS. Feels thirsty. Electrolytes repleted overnight.    T(F): 98.1 (12-12-18 @ 02:00), Max: 98.7 (12-11-18 @ 10:08)  HR: 70 (12-12-18 @ 05:00) (54 - 91)  BP: 134/71 (12-12-18 @ 05:00) (122/73 - 170/84)  ABP: --  ABP(mean): --  RR: 20 (12-12-18 @ 05:00) (15 - 28)  SpO2: 96% (12-12-18 @ 05:00) (91% - 99%)      12-11-18 @ 07:01  -  12-12-18 @ 05:53  --------------------------------------------------------  IN:    IV PiggyBack: 50 mL    lactated ringers.: 1875 mL  Total IN: 1925 mL    OUT:    Voided: 1655 mL  Total OUT: 1655 mL    Total NET: 270 mL        DIET/FLUIDS: lactated ringers. 1000 milliLiter(s) IV Continuous <Continuous>         GI proph:  pantoprazole  Injectable 40 milliGRAM(s) IV Push every 24 hours    AC/ proph: heparin  Injectable 5000 Unit(s) SubCutaneous every 8 hours    ABx: cefoTEtan  IVPB 2 Gram(s) IV Intermittent every 12 hours      PHYSICAL EXAM:  GENERAL: NAD, well-appearing  CHEST/LUNG: Clear to auscultation bilaterally  HEART: Regular rate and rhythm  ABDOMEN: Soft, obese Nontender, 5 incision sites c/d/i, Nondistended;         LABS  Labs:  CAPILLARY BLOOD GLUCOSE      POCT Blood Glucose.: 140 mg/dL (12 Dec 2018 00:03)  POCT Blood Glucose.: 128 mg/dL (11 Dec 2018 18:24)                          12.3   11.62 )-----------( 287      ( 11 Dec 2018 23:30 )             37.0       Auto Neutrophil %: 93.5 % (12-11-18 @ 23:30)  Auto Immature Granulocyte %: 0.5 % (12-11-18 @ 23:30)    12-11    138  |  98  |  8<L>  ----------------------------<  135<H>  4.2   |  25  |  0.7      Calcium, Total Serum: 9.5 mg/dL (12-11-18 @ 23:30)      LFTs:         Coags:     12.40  ----< 1.08    ( 11 Dec 2018 23:30 )     28.2

## 2018-12-12 NOTE — DISCHARGE NOTE ADULT - HOSPITAL COURSE
48 yr old female with Hx of obesity. Pt had failed numerous weight loss attempts and elected to have laparoscopic sleeve gastrectomy. Patient went to OR for Laparoscopic sleeve gastrectomy with laparoscopic repair of hiatal hernia. Intraoperatively, was found to have weakening of diaphragmatic hiatus/hernia, fatty enlarged liver. Patient tolerated the surgery well, no perioperative complications. POD 1, patient is stable and ready for discharge.

## 2018-12-12 NOTE — PROGRESS NOTE ADULT - ASSESSMENT
A/P:  GRACIA DIAZ is a 48yFemale HD2/POD1 from Laparoscopic sleeve gastrectomy with laparoscopic repair of hiatal hernia  . She is currently feeling well, eager to start clears.    Plan:   -encourage ambulation, IS  -DVT/GI ppx  -start bariatric clears  -likely downgrade today  -pain control  -monitor electrolytes

## 2018-12-12 NOTE — CHART NOTE - NSCHARTNOTEFT_GEN_A_CORE
49 YO F s/p lap sleeve gastrectomy - POD #1.  Tolerating juancarlos clear liquid diet well at 1 oz/hr.  Has protein shakes and chewable vitamins and minerals at home.  Patient verbalized understanding of phase I diet to begin upon discharge.  DROP sheet reviewed and all questions answered.  Will follow up in office next week.  Call [phone] with questions/concerns.

## 2018-12-12 NOTE — PROGRESS NOTE ADULT - SUBJECTIVE AND OBJECTIVE BOX
GRACIA DIAZ  [id number]  48y Female    Indication for ICU admission: s/p lap sleeve gastrectomy with hiatal hernia repair     Admit Date:12-11-18  ICU Date: 12-11-18  OR Date: 12-11-18    No Known Allergies    PAST MEDICAL & SURGICAL HISTORY:  GERD (gastroesophageal reflux disease)  PEGGY not on home CPAP   H/O tubal ligation: also had IUD embedded that was removed  H/O abdominal hysterectomy: 06/14/2018    Home Medications:  omeprazole 20 mg oral delayed release capsule: 1 cap(s) orally once a day, As Needed (11 Dec 2018 10:07)        24HRS EVENT:  Neuro: Pain control with PCA pump  Resp: Satting well on NC. Wean to RA  Cards: No dx. Keep normotensive. MAP >65  GI: NPO for now, start bariatric phase I diet tomorrow. PPI ppx. Zofran N/V. Give Compezine for break through N/V x1  : No vargas. Pending TOV   Heme: Hep Sub Q 5000 Q 8. SCD in place   ID: Laurence op ancef           DVT PTX: Heparin Sub Q. SCD     GI PTX: PPI    ***Tubes/Lines/Drains  ***  Peripheral IV      REVIEW OF SYSTEMS    [ x] A ten-point review of systems was otherwise negative except as noted. GRACIA DIAZ  [id number]  48y Female    Indication for ICU admission: s/p lap sleeve gastrectomy with hiatal hernia repair     Admit Date:12-11-18  ICU Date: 12-11-18  OR Date: 12-11-18    No Known Allergies    PAST MEDICAL & SURGICAL HISTORY:  GERD (gastroesophageal reflux disease)  PEGGY not on home CPAP   H/O tubal ligation: also had IUD embedded that was removed  H/O abdominal hysterectomy: 06/14/2018    Home Medications:  omeprazole 20 mg oral delayed release capsule: 1 cap(s) orally once a day, As Needed (11 Dec 2018 10:07)        24HRS EVENT:  Neuro: Pain control with PCA pump  Resp: Satting well on NC. Wean to RA  Cards: No dx. Keep normotensive. MAP >65  GI: Started on Bariatric clears. PPI ppx. Zofran N/V. Give Compazine for break through N/V x1  : No vargas. Pending TOV   Heme: Hep Sub Q 5000 Q 8. SCD in place   ID: Laurence op ancef         DVT PTX: Heparin Sub Q. SCD     GI PTX: PPI    ***Tubes/Lines/Drains  ***  Peripheral IV      REVIEW OF SYSTEMS    [ x] A ten-point review of systems was otherwise negative except as noted.        Daily Height in cm: 154.94 (11 Dec 2018 11:49)    Daily     Diet, Clear Liquid:   Bariatric Clear Liquid (BARICLLIQ) (12-12-18 @ 06:50)      CURRENT MEDS:  Neurologic Medications  HYDROmorphone  Injectable 0.5 milliGRAM(s) IV Push every 10 minutes PRN Moderate Pain (4 - 6)  morphine  - Injectable 2 milliGRAM(s) IV Push every 10 minutes PRN Severe Pain (7 - 10)  morphine PCA (5 mG/mL) 30 milliLiter(s) PCA Continuous PCA Continuous  ondansetron Injectable 4 milliGRAM(s) IV Push every 6 hours PRN Nausea  ondansetron Injectable 4 milliGRAM(s) IV Push every 8 hours  ondansetron Injectable 4 milliGRAM(s) IV Push once    Respiratory Medications    Cardiovascular Medications    Gastrointestinal Medications  lactated ringers. 1000 milliLiter(s) IV Continuous <Continuous>  pantoprazole  Injectable 40 milliGRAM(s) IV Push every 24 hours    Genitourinary Medications    Hematologic/Oncologic Medications  heparin  Injectable 5000 Unit(s) SubCutaneous every 8 hours    Antimicrobial/Immunologic Medications    Endocrine/Metabolic Medications    Topical/Other Medications  naloxone Injectable 0.1 milliGRAM(s) IV Push every 3 minutes PRN For ANY of the following changes in patient status:  A. RR LESS THAN 10 breaths per minute, B. Oxygen saturation LESS THAN 90%, C. Sedation score of 6      ICU Vital Signs Last 24 Hrs  T(C): 36.7 (12 Dec 2018 07:28), Max: 37.1 (11 Dec 2018 10:08)  T(F): 98.1 (12 Dec 2018 07:28), Max: 98.7 (11 Dec 2018 10:08)  HR: 67 (12 Dec 2018 08:00) (54 - 91)  BP: 143/87 (12 Dec 2018 08:00) (122/73 - 170/84)  BP(mean): 112 (12 Dec 2018 08:00) (88 - 118)  ABP: --  ABP(mean): --  RR: 21 (12 Dec 2018 08:00) (15 - 28)  SpO2: 99% (12 Dec 2018 08:00) (91% - 99%)      Adult Advanced Hemodynamics Last 24 Hrs  CVP(mm Hg): --  CVP(cm H2O): --  CO: --  CI: --  PA: --  PA(mean): --  PCWP: --  SVR: --  SVRI: --  PVR: --  PVRI: --          I&O's Summary    11 Dec 2018 07:01  -  12 Dec 2018 07:00  --------------------------------------------------------  IN: 2225 mL / OUT: 1855 mL / NET: 370 mL    12 Dec 2018 07:01  -  12 Dec 2018 08:33  --------------------------------------------------------  IN: 125 mL / OUT: 200 mL / NET: -75 mL      I&O's Detail    11 Dec 2018 07:01  -  12 Dec 2018 07:00  --------------------------------------------------------  IN:    IV PiggyBack: 100 mL    lactated ringers.: 2125 mL  Total IN: 2225 mL    OUT:    Voided: 1855 mL  Total OUT: 1855 mL    Total NET: 370 mL      12 Dec 2018 07:01  -  12 Dec 2018 08:33  --------------------------------------------------------  IN:    lactated ringers.: 125 mL  Total IN: 125 mL    OUT:    Voided: 200 mL  Total OUT: 200 mL    Total NET: -75 mL          PHYSICAL EXAM:    General/Neuro  RASS: 0      GCS: 15     Deficits:  alert & oriented x 3, no focal deficits  Pupils: PERRLA    Lungs: clear to auscultation, Normal expansion/effort.     Cardiovascular : S1, S2.  Regular rate and rhythm.  Peripheral edema   Cardiac Rhythm: Normal Sinus Rhythm    GI: Abdomen soft, Non-tender, Non-distended.    Wound: Clean, dry and intact.     Extremities: Extremities warm, pink, well-perfused.    Derm: Good skin turgor, no skin breakdown.      : Voiding freely    CXR:     LABS:  CAPILLARY BLOOD GLUCOSE      POCT Blood Glucose.: 111 mg/dL (12 Dec 2018 06:17)  POCT Blood Glucose.: 140 mg/dL (12 Dec 2018 00:03)  POCT Blood Glucose.: 128 mg/dL (11 Dec 2018 18:24)                          12.3   11.62 )-----------( 287      ( 11 Dec 2018 23:30 )             37.0       12-11    138  |  98  |  8<L>  ----------------------------<  135<H>  4.2   |  25  |  0.7    Ca    9.5      11 Dec 2018 23:30  Phos  3.0     12-11  Mg     1.5     12-11        PT/INR - ( 11 Dec 2018 23:30 )   PT: 12.40 sec;   INR: 1.08 ratio         PTT - ( 11 Dec 2018 23:30 )  PTT:28.2 sec

## 2018-12-13 ENCOUNTER — CLINICAL ADVICE (OUTPATIENT)
Age: 48
End: 2018-12-13

## 2018-12-14 PROBLEM — K21.9 GASTRO-ESOPHAGEAL REFLUX DISEASE WITHOUT ESOPHAGITIS: Chronic | Status: ACTIVE | Noted: 2018-11-30

## 2018-12-14 LAB — GLUCOSE BLDC GLUCOMTR-MCNC: 101 MG/DL — HIGH (ref 70–99)

## 2018-12-17 ENCOUNTER — FORM ENCOUNTER (OUTPATIENT)
Age: 48
End: 2018-12-17

## 2018-12-18 ENCOUNTER — OUTPATIENT (OUTPATIENT)
Dept: OUTPATIENT SERVICES | Facility: HOSPITAL | Age: 48
LOS: 1 days | Discharge: HOME | End: 2018-12-18

## 2018-12-18 DIAGNOSIS — E66.01 MORBID (SEVERE) OBESITY DUE TO EXCESS CALORIES: ICD-10-CM

## 2018-12-18 DIAGNOSIS — Z90.710 ACQUIRED ABSENCE OF BOTH CERVIX AND UTERUS: Chronic | ICD-10-CM

## 2018-12-18 DIAGNOSIS — Z98.51 TUBAL LIGATION STATUS: Chronic | ICD-10-CM

## 2018-12-18 LAB — SURGICAL PATHOLOGY STUDY: SIGNIFICANT CHANGE UP

## 2018-12-19 ENCOUNTER — APPOINTMENT (OUTPATIENT)
Dept: SURGERY | Facility: CLINIC | Age: 48
End: 2018-12-19
Payer: COMMERCIAL

## 2018-12-19 VITALS — HEIGHT: 60.5 IN | BODY MASS INDEX: 40.41 KG/M2 | WEIGHT: 211.3 LBS

## 2018-12-19 DIAGNOSIS — K21.9 GASTRO-ESOPHAGEAL REFLUX DISEASE WITHOUT ESOPHAGITIS: ICD-10-CM

## 2018-12-19 DIAGNOSIS — M25.50 PAIN IN UNSPECIFIED JOINT: ICD-10-CM

## 2018-12-19 DIAGNOSIS — K76.0 FATTY (CHANGE OF) LIVER, NOT ELSEWHERE CLASSIFIED: ICD-10-CM

## 2018-12-19 DIAGNOSIS — J45.909 UNSPECIFIED ASTHMA, UNCOMPLICATED: ICD-10-CM

## 2018-12-19 DIAGNOSIS — E66.01 MORBID (SEVERE) OBESITY DUE TO EXCESS CALORIES: ICD-10-CM

## 2018-12-19 DIAGNOSIS — G47.33 OBSTRUCTIVE SLEEP APNEA (ADULT) (PEDIATRIC): ICD-10-CM

## 2018-12-19 PROCEDURE — 99024 POSTOP FOLLOW-UP VISIT: CPT

## 2018-12-19 RX ORDER — ERGOCALCIFEROL 1.25 MG/1
1.25 MG CAPSULE, LIQUID FILLED ORAL
Qty: 4 | Refills: 3 | Status: DISCONTINUED | COMMUNITY
Start: 2018-07-02 | End: 2018-12-19

## 2018-12-19 RX ORDER — ACETAMINOPHEN AND CODEINE 300; 30 MG/1; MG/1
300-30 TABLET ORAL EVERY 6 HOURS
Qty: 8 | Refills: 0 | Status: DISCONTINUED | COMMUNITY
Start: 2018-12-05 | End: 2018-12-19

## 2018-12-21 ENCOUNTER — CLINICAL ADVICE (OUTPATIENT)
Age: 48
End: 2018-12-21

## 2019-01-11 ENCOUNTER — APPOINTMENT (OUTPATIENT)
Dept: SURGERY | Facility: CLINIC | Age: 49
End: 2019-01-11
Payer: COMMERCIAL

## 2019-01-11 VITALS — HEIGHT: 60.5 IN | WEIGHT: 202.6 LBS | BODY MASS INDEX: 38.75 KG/M2

## 2019-01-11 DIAGNOSIS — E66.01 MORBID (SEVERE) OBESITY DUE TO EXCESS CALORIES: ICD-10-CM

## 2019-01-11 PROCEDURE — 99024 POSTOP FOLLOW-UP VISIT: CPT

## 2019-01-11 RX ORDER — PANTOPRAZOLE 40 MG/1
40 TABLET, DELAYED RELEASE ORAL DAILY
Qty: 30 | Refills: 2 | Status: DISCONTINUED | COMMUNITY
Start: 2018-12-05 | End: 2019-01-11

## 2019-01-11 RX ORDER — CALCIUM CARBONATE/VITAMIN D3 500 MG-2.5
TABLET,CHEWABLE ORAL DAILY
Refills: 0 | Status: ACTIVE | COMMUNITY

## 2019-01-15 NOTE — HISTORY OF PRESENT ILLNESS
[Procedure: ___] : Procedure performed: [unfilled]  [Date of Surgery: ___] : Date of Surgery:   [unfilled] [Pre-Op Weight ___] : Pre-op weight was [unfilled] lbs [___ Months Post Op] : [unfilled] months [de-identified] : Patient had surgery approximately 1 month ago. \par She denies heartburn/reflux.  She c/o constipation which is relieved by MiraLax.  \par Her asthma and back pain is unchanged.

## 2019-01-15 NOTE — PLAN
[FreeTextEntry1] : PLAN:  Increase exercise.\par             Start Actigall twice daily.\par             Return to office in 2 months with blood work.\par             Call with concerns.

## 2019-01-15 NOTE — ASSESSMENT
[FreeTextEntry1] : GRACIA DIAZ is a 48 year female seen today for Bariatric follow up visit. Patient is doing well.\par Patient is compliant with dietary guidelines and is focusing on proteins at each meal.\par Fluid intake is adequate with mainly Crystal Light and diet iced tea.\par She is exercising 2- 3 days week for 20 minutes. Recommend that she increase her exercise to 3-4 days/week.  She plans to go the gym.  Reinforced no heavy lifting and bending for 1 more month.\par \par

## 2019-01-15 NOTE — DATA REVIEWED
[FreeTextEntry1] : \par Wt. change since last visit: -8.7 lbs\par %EWL: 18\par TWL: 21.4 lbs\par BMI: 38\par \par

## 2019-03-08 ENCOUNTER — APPOINTMENT (OUTPATIENT)
Dept: SURGERY | Facility: CLINIC | Age: 49
End: 2019-03-08
Payer: COMMERCIAL

## 2019-03-08 VITALS — BODY MASS INDEX: 35.44 KG/M2 | WEIGHT: 185.3 LBS | HEIGHT: 60.5 IN

## 2019-03-08 DIAGNOSIS — Z86.39 PERSONAL HISTORY OF OTHER ENDOCRINE, NUTRITIONAL AND METABOLIC DISEASE: ICD-10-CM

## 2019-03-08 DIAGNOSIS — M25.562 PAIN IN LEFT KNEE: ICD-10-CM

## 2019-03-08 DIAGNOSIS — Z00.00 ENCOUNTER FOR GENERAL ADULT MEDICAL EXAMINATION W/OUT ABNORMAL FINDINGS: ICD-10-CM

## 2019-03-08 PROCEDURE — 99024 POSTOP FOLLOW-UP VISIT: CPT

## 2019-03-08 NOTE — ASSESSMENT
[FreeTextEntry1] : GRACIA DIAZ is a 48 year female seen today for Bariatric follow up visit. Patient is doing well with her weight loss goals.\par Patient is compliant with dietary guidelines and includes proteins at each meal. She continue to plan her meals.\par Fluid intake is adequate with mainly Crystal Light.\par She is exercising 3 days week for 80 minutes.  Recommend adding weight bearing exercise.\par \par

## 2019-03-08 NOTE — REASON FOR VISIT
[Post Operative Visit] : a post operative visit for [Other___] : [unfilled] [FreeTextEntry2] : Sleeve Gastrectomy on 12/11/18

## 2019-03-08 NOTE — HISTORY OF PRESENT ILLNESS
[Procedure: ___] : Procedure performed: [unfilled]  [Date of Surgery: ___] : Date of Surgery:   [unfilled] [Pre-Op Weight ___] : Pre-op weight was [unfilled] lbs [___ Months Post Op] : [unfilled] months [de-identified] : Patient had surgery approximately 3 months ago. \par She has an occasional episode of heartburn.  She denies reflux/vomiting and food intolerance.\par Her back pain is unchanged but her asthma improved.\par

## 2019-03-08 NOTE — DATA REVIEWED
[FreeTextEntry1] : \par Wt. change since last visit: -17.3 lbs\par %EWL: 31\par TWL: 36.7 lbs\par BMI: 35.5\par \par

## 2019-03-08 NOTE — PLAN
[FreeTextEntry1] : PLAN:  Add weight bearing exercises.\par             Continue with diet and exercise.\par             Return to office in 3 months with blood work.\par             Call with concerns.

## 2019-06-07 ENCOUNTER — APPOINTMENT (OUTPATIENT)
Dept: SURGERY | Facility: CLINIC | Age: 49
End: 2019-06-07
Payer: COMMERCIAL

## 2019-06-07 VITALS — WEIGHT: 169.8 LBS | BODY MASS INDEX: 32.48 KG/M2 | HEIGHT: 60.5 IN

## 2019-06-07 PROCEDURE — 99213 OFFICE O/P EST LOW 20 MIN: CPT

## 2019-06-14 NOTE — DATA REVIEWED
[FreeTextEntry1] : \par Wt. change since last visit: -15.5 lbs\par %EWL: 44\par TWL: 52.2 lbs\par BMI: 32\par \par Blood work reviewed with patient. Her vitamin d levels are low and we will add vitamin d 2000 units daily. \par \par

## 2019-06-14 NOTE — CONSULT LETTER
[Courtesy Letter:] : I had the pleasure of seeing your patient, [unfilled], in my office today. [Consult Closing:] : Thank you very much for allowing me to participate in the care of this patient.  If you have any questions, please do not hesitate to contact me. [Please see my note below.] : Please see my note below. [Sincerely,] : Sincerely, [Dear  ___] : Dear  [unfilled], [FreeTextEntry3] : Ivania Bermeo MD, FACS, FASMBS\par Chief, General, Bariatric & Minimally Invasive Surgery\par Director, Quality & Performance Improvement\par Director, General Surgery Residency Program\par Director, Advanced GI MIS Fellowship\par Department of Surgery\par [facility] \par [facility]\par

## 2019-06-14 NOTE — PLAN
[FreeTextEntry1] : PLAN:  UGI with cine.\par             Return to office in 3 months with blood work\par             Call with concerns.

## 2019-06-14 NOTE — ASSESSMENT
[FreeTextEntry1] : GRACIA DIAZ is a 48 year female seen today for Bariatric follow up visit. Patient is doing well with her weight loss goals.\par Patient is compliant with dietary guidelines.\par She eats 3 meals/day with an occasional healthful snack. Breakfast - protein shake, scrambled egg, oatmeal or an omelet.  Lunch - 3 crackers with nuts and crackers, hard boiled egg and sometimes a piece of fruit. Dinner - fish, chicken with green leafy vegetables with an occasional serving of sweet potato or pasta. Snacks rarely and it is usually nuts or fruits. \par Fluid intake is adequate with mainly Vitamin water zero and Crystal Light.\par She is walking and jogging 5 days/week for 90.  Recommend adding weight bearing exercise.\par \par

## 2019-06-14 NOTE — REASON FOR VISIT
[Follow-Up Visit] : a follow-up visit for [Other___] : [unfilled] [FreeTextEntry2] : Sleeve Gastrectomy on 12/11/2018

## 2019-06-19 ENCOUNTER — OUTPATIENT (OUTPATIENT)
Dept: OUTPATIENT SERVICES | Facility: HOSPITAL | Age: 49
LOS: 1 days | Discharge: HOME | End: 2019-06-19
Payer: COMMERCIAL

## 2019-06-19 ENCOUNTER — OTHER (OUTPATIENT)
Age: 49
End: 2019-06-19

## 2019-06-19 DIAGNOSIS — Z98.51 TUBAL LIGATION STATUS: Chronic | ICD-10-CM

## 2019-06-19 DIAGNOSIS — Z90.710 ACQUIRED ABSENCE OF BOTH CERVIX AND UTERUS: Chronic | ICD-10-CM

## 2019-06-19 DIAGNOSIS — K21.9 GASTRO-ESOPHAGEAL REFLUX DISEASE WITHOUT ESOPHAGITIS: ICD-10-CM

## 2019-06-19 PROCEDURE — 74247: CPT | Mod: 26

## 2019-09-11 ENCOUNTER — APPOINTMENT (OUTPATIENT)
Dept: SURGERY | Facility: CLINIC | Age: 49
End: 2019-09-11

## 2019-09-25 ENCOUNTER — APPOINTMENT (OUTPATIENT)
Dept: SURGERY | Facility: CLINIC | Age: 49
End: 2019-09-25
Payer: COMMERCIAL

## 2019-09-25 VITALS — WEIGHT: 161 LBS | HEIGHT: 60.5 IN | BODY MASS INDEX: 30.79 KG/M2

## 2019-09-25 PROCEDURE — 99213 OFFICE O/P EST LOW 20 MIN: CPT

## 2019-09-25 RX ORDER — URSODIOL 300 MG/1
300 CAPSULE ORAL
Qty: 60 | Refills: 5 | Status: COMPLETED | COMMUNITY
Start: 2019-01-11 | End: 2019-09-25

## 2019-09-26 NOTE — ASSESSMENT
[FreeTextEntry1] : GRACIA DIAZ is a 48 year female seen today for Bariatric follow up visit. Patient is doing well with her weight loss goals.\par Patient is compliant with dietary guidelines and meal planning..\par She eats 3 meals/day with an occasional healthful snack. Breakfast - protein shake or eggs.  Lunch - cheese with nuts and yogurt, mixed green salad with eggs or cheese, or 1 slice of flat bread with turkey and cheese. Dinner - fish, chicken, crabs with grilled vegetables.  She has an occasional serving of pasta or a piece of potato. Snacks - fruits or skinny girl popcorn.\par She is walking on either the treadmill or elliptical 5 days/week for 30-45 minutes. Recommend adding weight bearing exercises. \par \par

## 2019-09-26 NOTE — HISTORY OF PRESENT ILLNESS
[Procedure: ___] : Procedure performed: [unfilled]  [Pre-Op Weight ___] : Pre-op weight was [unfilled] lbs [Date of Surgery: ___] : Date of Surgery:   [unfilled] [___ Months Post Op] : [unfilled] months [de-identified] : Patient had surgery approximately 9 months ago. \par Her heartburn is much improved she has even skipped a few days of her PPI with no problem. She is taking Omeprazole twice daily and adjusted her dinner schedule.  Her last meal is before 7 pm most days. She denies reflux/vomiting and food intolerance.\par Asthma and back pain much improved.\par

## 2019-09-26 NOTE — PLAN
[FreeTextEntry1] : PLAN:  Continue with diet and exercise.\par             Add weight bearing exercises.\par             RTO in 3 months with blood work.\par             Call with concerns.

## 2019-09-26 NOTE — DATA REVIEWED
[FreeTextEntry1] : \par Wt. change since last visit: 8.8 lbs\par %EWL: 52\par TWL: 61 lbs\par BMI: 30\par \par Blood work reviewed, except for a slight decrease in her vitamin d level all other values are within normal limits.\par \par

## 2019-12-09 PROBLEM — E56.9 VITAMIN DEFICIENCY, UNSPECIFIED: Status: ACTIVE | Noted: 2019-12-09

## 2019-12-09 PROBLEM — E63.9 NUTRITIONAL DEFICIENCY, UNSPECIFIED: Status: ACTIVE | Noted: 2019-12-09

## 2019-12-11 ENCOUNTER — APPOINTMENT (OUTPATIENT)
Dept: SURGERY | Facility: CLINIC | Age: 49
End: 2019-12-11
Payer: COMMERCIAL

## 2019-12-11 DIAGNOSIS — E56.9 VITAMIN DEFICIENCY, UNSPECIFIED: ICD-10-CM

## 2019-12-11 DIAGNOSIS — E63.9 NUTRITIONAL DEFICIENCY, UNSPECIFIED: ICD-10-CM

## 2019-12-13 ENCOUNTER — OTHER (OUTPATIENT)
Age: 49
End: 2019-12-13

## 2019-12-13 ENCOUNTER — RESULT REVIEW (OUTPATIENT)
Age: 49
End: 2019-12-13

## 2019-12-13 RX ORDER — MULTIVITAMIN WITH IRON
TABLET ORAL DAILY
Refills: 0 | Status: DISCONTINUED | COMMUNITY
Start: 2018-07-10 | End: 2019-12-13

## 2020-01-15 ENCOUNTER — APPOINTMENT (OUTPATIENT)
Dept: SURGERY | Facility: CLINIC | Age: 50
End: 2020-01-15
Payer: COMMERCIAL

## 2020-01-15 VITALS — WEIGHT: 158 LBS | HEIGHT: 60.5 IN | BODY MASS INDEX: 30.22 KG/M2

## 2020-01-15 DIAGNOSIS — Z87.09 PERSONAL HISTORY OF OTHER DISEASES OF THE RESPIRATORY SYSTEM: ICD-10-CM

## 2020-01-15 DIAGNOSIS — M54.5 LOW BACK PAIN: ICD-10-CM

## 2020-01-15 DIAGNOSIS — E66.9 OBESITY, UNSPECIFIED: ICD-10-CM

## 2020-01-15 DIAGNOSIS — K21.9 GASTRO-ESOPHAGEAL REFLUX DISEASE W/OUT ESOPHAGITIS: ICD-10-CM

## 2020-01-15 DIAGNOSIS — G89.29 LOW BACK PAIN: ICD-10-CM

## 2020-01-15 PROCEDURE — 99213 OFFICE O/P EST LOW 20 MIN: CPT

## 2020-01-15 RX ORDER — UMECLIDINIUM 62.5 UG/1
62.5 AEROSOL, POWDER ORAL
Refills: 0 | Status: COMPLETED | COMMUNITY
Start: 2018-06-11 | End: 2020-01-15

## 2020-01-15 RX ORDER — PEDI MULTIVIT NO.25/FOLIC ACID 300 MCG
60 TABLET,CHEWABLE ORAL TWICE DAILY
Refills: 0 | Status: COMPLETED | COMMUNITY
Start: 2019-01-11 | End: 2020-01-15

## 2020-01-15 RX ORDER — OMEPRAZOLE 40 MG/1
40 CAPSULE, DELAYED RELEASE ORAL
Qty: 30 | Refills: 2 | Status: COMPLETED | COMMUNITY
Start: 2019-01-11 | End: 2020-01-15

## 2020-01-15 RX ORDER — ADHESIVE TAPE 3"X 2.3 YD
50 MCG TAPE, NON-MEDICATED TOPICAL DAILY
Refills: 0 | Status: ACTIVE | COMMUNITY

## 2020-01-15 RX ORDER — MULTIVITAMIN
TABLET ORAL DAILY
Refills: 0 | Status: ACTIVE | COMMUNITY

## 2020-01-15 RX ORDER — FLUTICASONE FUROATE AND VILANTEROL TRIFENATATE 200; 25 UG/1; UG/1
200-25 POWDER RESPIRATORY (INHALATION)
Refills: 0 | Status: COMPLETED | COMMUNITY
Start: 2018-06-11 | End: 2020-01-15

## 2020-01-15 RX ORDER — LORATADINE 5 MG
17 TABLET,CHEWABLE ORAL DAILY
Refills: 0 | Status: COMPLETED | COMMUNITY
Start: 2019-01-11 | End: 2020-01-15

## 2020-01-24 NOTE — ASSESSMENT
[FreeTextEntry1] : GRACIA DIAZ is a 48 year female seen today for Bariatric follow up visit. Patient is doing well with her weight loss goals.\par Patient is compliant with dietary guidelines and meal planning..\par She eats 3 meals/day with an occasional healthful snack. Breakfast - protein shake or eggs. Lunch - cheese with nuts and 6 grapes, mixed green salad with eggs/cheese, or 1 slice of flat bread with turkey and cheese. Dinner - fish, chicken, crabs, meatloaf with grilled vegetables. She has an occasional serving of pasta or a teaspoon of mashed potato. Snacks - fruits or skinny girl popcorn.\par She is exercising at the gym 3 days/week for 45 minutes she combines cardio and weight bearing exercises.  She also walks 2 days/week for 30 minutes. \par \par

## 2020-01-24 NOTE — HISTORY OF PRESENT ILLNESS
[Procedure: ___] : Procedure performed: [unfilled]  [Date of Surgery: ___] : Date of Surgery:   [unfilled] [Pre-Op Weight ___] : Pre-op weight was [unfilled] lbs [___ Years Post Op] : [unfilled] years [de-identified] : Patient had surgery approximately 1 year ago. \par She denies heartburn/reflux/vomiting.  She has poor tolerance to broccoli. \par Asthma, constipation and GERD resolved.

## 2020-01-24 NOTE — PLAN
[FreeTextEntry1] : PLAN:  Continue with diet and exercise.\par             RTO in 3 months.\par             Call with concerns.

## 2020-01-24 NOTE — DATA REVIEWED
[FreeTextEntry1] : \par Wt. change since last visit: -3 lbs\par %EWL: 54\par TWL: 64 lbs\par BMI: 30\par

## 2020-06-02 ENCOUNTER — APPOINTMENT (OUTPATIENT)
Dept: SURGERY | Facility: CLINIC | Age: 50
End: 2020-06-02

## 2021-11-26 NOTE — H&P PST ADULT - RESPIRATORY RATE (BREATHS/MIN)
Graft Donor Site Bandage (Optional-Leave Blank If You Don't Want In Note): Steri-strips and a pressure bandage were applied to the donor site. no 16

## 2022-08-03 NOTE — ADDENDUM
[FreeTextEntry1] : Plan of care reviewed with practitioner and I concur with the assessment; note amended as appropriate.\par 
month(s)

## 2022-08-18 NOTE — HISTORY OF PRESENT ILLNESS
[Pre-Op Weight ___] : Pre-op weight was [unfilled] lbs [Date of Surgery: ___] : Date of Surgery:   [unfilled] [Procedure: ___] : Procedure performed: [unfilled]  [___ Months Post Op] : [unfilled] months [de-identified] : Patient had surgery approximately 6 months ago. \par She is c/o heartburn in the morning. She denies reflux, vomiting and food intolerance.\par Her back pain improved and her asthma is unchanged. Estlander Flap (Lower To Upper Lip) Text: The defect of the lower lip was assessed and measured.  Given the location and size of the defect, an Estlander flap was deemed most appropriate.  Using a sterile surgical marker, an appropriate Estlander flap was measured and drawn on the upper lip. Local anesthesia was then infiltrated. A scalpel was then used to incise the lateral aspect of the flap, through skin, muscle and mucosa, leaving the flap pedicled medially.  The flap was then rotated and positioned to fill the lower lip defect.  The flap was then sutured into place with a three layer technique, closing the orbicularis oris muscle layer with subcutaneous buried sutures, followed by a mucosal layer and an epidermal layer.

## 2022-09-11 NOTE — DISCHARGE NOTE ADULT - COMPLETE THE FOLLOWING IF THE PATIENT REFUSES THE INFLUENZA VACCINE:
N/A
Risks/benefits discussed with patient/surrogate/Vaccine Information Sheet (VIS) provided-VIS date: 8/07/15

## 2023-01-06 ENCOUNTER — EMERGENCY (EMERGENCY)
Facility: HOSPITAL | Age: 53
LOS: 0 days | Discharge: HOME | End: 2023-01-07
Attending: EMERGENCY MEDICINE | Admitting: EMERGENCY MEDICINE
Payer: COMMERCIAL

## 2023-01-06 VITALS
TEMPERATURE: 96 F | HEIGHT: 63 IN | RESPIRATION RATE: 20 BRPM | WEIGHT: 162.04 LBS | SYSTOLIC BLOOD PRESSURE: 137 MMHG | DIASTOLIC BLOOD PRESSURE: 83 MMHG | OXYGEN SATURATION: 96 % | HEART RATE: 88 BPM

## 2023-01-06 DIAGNOSIS — Z98.51 TUBAL LIGATION STATUS: Chronic | ICD-10-CM

## 2023-01-06 DIAGNOSIS — R06.02 SHORTNESS OF BREATH: ICD-10-CM

## 2023-01-06 DIAGNOSIS — J45.909 UNSPECIFIED ASTHMA, UNCOMPLICATED: ICD-10-CM

## 2023-01-06 DIAGNOSIS — R07.89 OTHER CHEST PAIN: ICD-10-CM

## 2023-01-06 DIAGNOSIS — Z20.822 CONTACT WITH AND (SUSPECTED) EXPOSURE TO COVID-19: ICD-10-CM

## 2023-01-06 DIAGNOSIS — Z82.49 FAMILY HISTORY OF ISCHEMIC HEART DISEASE AND OTHER DISEASES OF THE CIRCULATORY SYSTEM: ICD-10-CM

## 2023-01-06 DIAGNOSIS — Z98.51 TUBAL LIGATION STATUS: ICD-10-CM

## 2023-01-06 DIAGNOSIS — K44.9 DIAPHRAGMATIC HERNIA WITHOUT OBSTRUCTION OR GANGRENE: ICD-10-CM

## 2023-01-06 DIAGNOSIS — Z90.710 ACQUIRED ABSENCE OF BOTH CERVIX AND UTERUS: Chronic | ICD-10-CM

## 2023-01-06 DIAGNOSIS — K21.9 GASTRO-ESOPHAGEAL REFLUX DISEASE WITHOUT ESOPHAGITIS: ICD-10-CM

## 2023-01-06 DIAGNOSIS — Z87.891 PERSONAL HISTORY OF NICOTINE DEPENDENCE: ICD-10-CM

## 2023-01-06 LAB
ALBUMIN SERPL ELPH-MCNC: 5.1 G/DL — SIGNIFICANT CHANGE UP (ref 3.5–5.2)
ALP SERPL-CCNC: 104 U/L — SIGNIFICANT CHANGE UP (ref 30–115)
ALT FLD-CCNC: 33 U/L — SIGNIFICANT CHANGE UP (ref 0–41)
ANION GAP SERPL CALC-SCNC: 8 MMOL/L — SIGNIFICANT CHANGE UP (ref 7–14)
AST SERPL-CCNC: 28 U/L — SIGNIFICANT CHANGE UP (ref 0–41)
BASOPHILS # BLD AUTO: 0.02 K/UL — SIGNIFICANT CHANGE UP (ref 0–0.2)
BASOPHILS NFR BLD AUTO: 0.2 % — SIGNIFICANT CHANGE UP (ref 0–1)
BILIRUB SERPL-MCNC: 0.3 MG/DL — SIGNIFICANT CHANGE UP (ref 0.2–1.2)
BUN SERPL-MCNC: 9 MG/DL — LOW (ref 10–20)
CALCIUM SERPL-MCNC: 9.9 MG/DL — SIGNIFICANT CHANGE UP (ref 8.4–10.4)
CHLORIDE SERPL-SCNC: 100 MMOL/L — SIGNIFICANT CHANGE UP (ref 98–110)
CO2 SERPL-SCNC: 31 MMOL/L — SIGNIFICANT CHANGE UP (ref 17–32)
CREAT SERPL-MCNC: 0.6 MG/DL — LOW (ref 0.7–1.5)
EGFR: 109 ML/MIN/1.73M2 — SIGNIFICANT CHANGE UP
EOSINOPHIL # BLD AUTO: 0.04 K/UL — SIGNIFICANT CHANGE UP (ref 0–0.7)
EOSINOPHIL NFR BLD AUTO: 0.4 % — SIGNIFICANT CHANGE UP (ref 0–8)
GLUCOSE SERPL-MCNC: 84 MG/DL — SIGNIFICANT CHANGE UP (ref 70–99)
HCG SERPL QL: NEGATIVE — SIGNIFICANT CHANGE UP
HCT VFR BLD CALC: 40.6 % — SIGNIFICANT CHANGE UP (ref 37–47)
HGB BLD-MCNC: 13 G/DL — SIGNIFICANT CHANGE UP (ref 12–16)
IMM GRANULOCYTES NFR BLD AUTO: 0.4 % — HIGH (ref 0.1–0.3)
LYMPHOCYTES # BLD AUTO: 0.83 K/UL — LOW (ref 1.2–3.4)
LYMPHOCYTES # BLD AUTO: 7.6 % — LOW (ref 20.5–51.1)
MAGNESIUM SERPL-MCNC: 1.9 MG/DL — SIGNIFICANT CHANGE UP (ref 1.8–2.4)
MCHC RBC-ENTMCNC: 28.8 PG — SIGNIFICANT CHANGE UP (ref 27–31)
MCHC RBC-ENTMCNC: 32 G/DL — SIGNIFICANT CHANGE UP (ref 32–37)
MCV RBC AUTO: 89.8 FL — SIGNIFICANT CHANGE UP (ref 81–99)
MONOCYTES # BLD AUTO: 0.42 K/UL — SIGNIFICANT CHANGE UP (ref 0.1–0.6)
MONOCYTES NFR BLD AUTO: 3.9 % — SIGNIFICANT CHANGE UP (ref 1.7–9.3)
NEUTROPHILS # BLD AUTO: 9.52 K/UL — HIGH (ref 1.4–6.5)
NEUTROPHILS NFR BLD AUTO: 87.5 % — HIGH (ref 42.2–75.2)
NRBC # BLD: 0 /100 WBCS — SIGNIFICANT CHANGE UP (ref 0–0)
NT-PROBNP SERPL-SCNC: 111 PG/ML — SIGNIFICANT CHANGE UP (ref 0–300)
PLATELET # BLD AUTO: 314 K/UL — SIGNIFICANT CHANGE UP (ref 130–400)
POTASSIUM SERPL-MCNC: 4.5 MMOL/L — SIGNIFICANT CHANGE UP (ref 3.5–5)
POTASSIUM SERPL-SCNC: 4.5 MMOL/L — SIGNIFICANT CHANGE UP (ref 3.5–5)
PROT SERPL-MCNC: 7.6 G/DL — SIGNIFICANT CHANGE UP (ref 6–8)
RBC # BLD: 4.52 M/UL — SIGNIFICANT CHANGE UP (ref 4.2–5.4)
RBC # FLD: 12.3 % — SIGNIFICANT CHANGE UP (ref 11.5–14.5)
SARS-COV-2 RNA SPEC QL NAA+PROBE: SIGNIFICANT CHANGE UP
SODIUM SERPL-SCNC: 139 MMOL/L — SIGNIFICANT CHANGE UP (ref 135–146)
TROPONIN T SERPL-MCNC: <0.01 NG/ML — SIGNIFICANT CHANGE UP
TROPONIN T SERPL-MCNC: <0.01 NG/ML — SIGNIFICANT CHANGE UP
WBC # BLD: 10.87 K/UL — HIGH (ref 4.8–10.8)
WBC # FLD AUTO: 10.87 K/UL — HIGH (ref 4.8–10.8)

## 2023-01-06 PROCEDURE — 93010 ELECTROCARDIOGRAM REPORT: CPT

## 2023-01-06 PROCEDURE — 99223 1ST HOSP IP/OBS HIGH 75: CPT

## 2023-01-06 PROCEDURE — 71045 X-RAY EXAM CHEST 1 VIEW: CPT | Mod: 26

## 2023-01-06 RX ORDER — IPRATROPIUM/ALBUTEROL SULFATE 18-103MCG
3 AEROSOL WITH ADAPTER (GRAM) INHALATION ONCE
Refills: 0 | Status: COMPLETED | OUTPATIENT
Start: 2023-01-06 | End: 2023-01-06

## 2023-01-06 RX ORDER — ACETAMINOPHEN 500 MG
650 TABLET ORAL ONCE
Refills: 0 | Status: COMPLETED | OUTPATIENT
Start: 2023-01-06 | End: 2023-01-06

## 2023-01-06 RX ORDER — METOPROLOL TARTRATE 50 MG
100 TABLET ORAL ONCE
Refills: 0 | Status: COMPLETED | OUTPATIENT
Start: 2023-01-06 | End: 2023-01-06

## 2023-01-06 RX ADMIN — Medication 650 MILLIGRAM(S): at 20:20

## 2023-01-06 RX ADMIN — Medication 100 MILLIGRAM(S): at 22:05

## 2023-01-06 RX ADMIN — Medication 3 MILLILITER(S): at 16:07

## 2023-01-06 NOTE — ED ADULT NURSE NOTE - NSIMPLEMENTINTERV_GEN_ALL_ED
Implemented All Universal Safety Interventions:  [place] to call system. Call bell, personal items and telephone within reach. Instruct patient to call for assistance. Room bathroom lighting operational. Non-slip footwear when patient is off stretcher. Physically safe environment: no spills, clutter or unnecessary equipment. Stretcher in lowest position, wheels locked, appropriate side rails in place.

## 2023-01-06 NOTE — ED PROVIDER NOTE - CONSIDERATION OF ADMISSION OBSERVATION
Pt with chest pain, ED w/u neg for acs, pt to have further evaluation in observation unit.  Labs and EKG were ordered and reviewed.  Imaging was ordered and reviewed by me.  Appropriate medications for patient's presenting complaints were ordered and effects were reassessed.  Patient's records (prior hospital, ED visit, and/or nursing home notes if available) were reviewed.  Additional history was obtained from EMS, family, and/or PCP (where available).  Escalation to admission/observation was considered. Patient requires inpatient hospitalization - monitored setting. Consideration of Admission/Observation

## 2023-01-06 NOTE — ED ADULT NURSE NOTE - NSICDXPASTSURGICALHX_GEN_ALL_CORE_FT
PAST SURGICAL HISTORY:  H/O abdominal hysterectomy 06/14/2018    H/O tubal ligation also had IUD embedded that was removed

## 2023-01-06 NOTE — ED ADULT NURSE NOTE - OBJECTIVE STATEMENT
Pt reports chest pressure noted midsternal area with sob with exertion. Denies fever, nausea, vomiting, cough, and diarrhea.

## 2023-01-06 NOTE — ED PROVIDER NOTE - CLINICAL SUMMARY MEDICAL DECISION MAKING FREE TEXT BOX
Pt with chest pain, ED w/u neg for acs, pt to have further evaluation in observation unit.  Labs and EKG were ordered and reviewed.  Imaging was ordered and reviewed by me.  Appropriate medications for patient's presenting complaints were ordered and effects were reassessed.  Patient's records (prior hospital, ED visit, and/or nursing home notes if available) were reviewed.  Additional history was obtained from EMS, family, and/or PCP (where available).  Escalation to admission/observation was considered. Patient requires inpatient hospitalization - monitored setting.

## 2023-01-06 NOTE — ED PROVIDER NOTE - OBJECTIVE STATEMENT
53 yo female with no pertinent pmh presents c/o chest pain/SOB that started this morning. pt describes the chest pain as pressure in nature and states it has been constant since onset. pt states her SOB is worsened with exertion. pt denies any other symptoms including fevers, chill, headache, recent illness/travel, cough, abdominal pain.

## 2023-01-06 NOTE — ED CDU PROVIDER INITIAL DAY NOTE - PROGRESS NOTE DETAILS
received signout from Maldonado rOantes pt in obs for evaluation of chest pain; initial ce/ekg unremarkable; 2nd ce/ekg/covid pending; ccta in am;

## 2023-01-06 NOTE — ED PROVIDER NOTE - ATTENDING APP SHARED VISIT CONTRIBUTION OF CARE
52-year-old female with past medical history of GERD, previous smoker, presents with complaint of chest pressure and dyspnea on exertion.  Patient says started this morning.  Patient admits has been worsening throughout the day.  Denies any leg swelling or leg pain.  No URI symptoms, fever, cough.  Patient says mother  from MI at 62.  Patient had seen a cardiologist 3 years ago and had normal testing.  Cannot recall cardiologist name.  Exam: nad, ncat, perrl, eomi, mmm, rrr, mildly decreased breath sounds diffusely, abd soft, nt, nd aox3, no calf tenderness, no pitting edema impression: Patient with chest pressure and dyspnea on exertion, positive family history and previous smoker, will rule out ACS, EKG, chest x-ray, labs, trial of nebulizer

## 2023-01-06 NOTE — ED CDU PROVIDER INITIAL DAY NOTE - ATTENDING APP SHARED VISIT CONTRIBUTION OF CARE
Pt presents with chest tightness since 4 am yesterday. Associated with shortness of breath. Better after nebulizer treatment here. No cough, nausea, vomiting or diaphoresis. Hx x of asthma. Quit tob 12 years ago. Family hx of CAD. On exam S1S2 rrr, lungs clear, abdomen is soft nontender, ext neg for edema or tenderness.

## 2023-01-06 NOTE — ED PROVIDER NOTE - NS ED ATTENDING STATEMENT MOD
This was a shared visit with the EVELYN. I reviewed and verified the documentation and independently performed the documented:

## 2023-01-06 NOTE — ED ADULT NURSE NOTE - NSICDXFAMILYHX_GEN_ALL_CORE_FT
FAMILY HISTORY:  Mother  Still living? No  CAD (coronary artery disease), Age at diagnosis: Age Unknown

## 2023-01-07 VITALS
TEMPERATURE: 97 F | OXYGEN SATURATION: 96 % | RESPIRATION RATE: 18 BRPM | DIASTOLIC BLOOD PRESSURE: 60 MMHG | SYSTOLIC BLOOD PRESSURE: 117 MMHG | HEART RATE: 68 BPM

## 2023-01-07 PROCEDURE — 75574 CT ANGIO HRT W/3D IMAGE: CPT | Mod: 26,MA

## 2023-01-07 PROCEDURE — 99239 HOSP IP/OBS DSCHRG MGMT >30: CPT

## 2023-01-07 RX ORDER — IPRATROPIUM/ALBUTEROL SULFATE 18-103MCG
3 AEROSOL WITH ADAPTER (GRAM) INHALATION ONCE
Refills: 0 | Status: COMPLETED | OUTPATIENT
Start: 2023-01-07 | End: 2023-01-07

## 2023-01-07 RX ORDER — METOPROLOL TARTRATE 50 MG
100 TABLET ORAL ONCE
Refills: 0 | Status: COMPLETED | OUTPATIENT
Start: 2023-01-07 | End: 2023-01-07

## 2023-01-07 RX ORDER — FAMOTIDINE 10 MG/ML
20 INJECTION INTRAVENOUS ONCE
Refills: 0 | Status: COMPLETED | OUTPATIENT
Start: 2023-01-07 | End: 2023-01-07

## 2023-01-07 RX ORDER — SODIUM CHLORIDE 9 MG/ML
1000 INJECTION INTRAMUSCULAR; INTRAVENOUS; SUBCUTANEOUS ONCE
Refills: 0 | Status: COMPLETED | OUTPATIENT
Start: 2023-01-07 | End: 2023-01-07

## 2023-01-07 RX ORDER — ALBUTEROL 90 UG/1
2 AEROSOL, METERED ORAL
Qty: 1 | Refills: 0
Start: 2023-01-07

## 2023-01-07 RX ADMIN — Medication 100 MILLIGRAM(S): at 07:28

## 2023-01-07 RX ADMIN — Medication 650 MILLIGRAM(S): at 11:42

## 2023-01-07 RX ADMIN — FAMOTIDINE 100 MILLIGRAM(S): 10 INJECTION INTRAVENOUS at 10:24

## 2023-01-07 RX ADMIN — Medication 30 MILLILITER(S): at 04:24

## 2023-01-07 RX ADMIN — FAMOTIDINE 20 MILLIGRAM(S): 10 INJECTION INTRAVENOUS at 11:42

## 2023-01-07 RX ADMIN — Medication 3 MILLILITER(S): at 10:45

## 2023-01-07 RX ADMIN — SODIUM CHLORIDE 1000 MILLILITER(S): 9 INJECTION INTRAMUSCULAR; INTRAVENOUS; SUBCUTANEOUS at 01:29

## 2023-01-07 NOTE — ED ADULT NURSE REASSESSMENT NOTE - NS ED NURSE REASSESS COMMENT FT1
Pt assessed, A&Ox4, VSS. Pt denies pain/discomfort at this time. Pt states "the albuterol relieved the chest pressure, I feel so much better." Pt maintained on cardiac monitor. No acute distress noted. Pt awaiting Ct results. Safety and comfort measures maintained. Will continue to monitor

## 2023-01-07 NOTE — ED CDU PROVIDER DISPOSITION NOTE - PATIENT PORTAL LINK FT
You can access the FollowMyHealth Patient Portal offered by [facility] by registering at the following website: [email or web address]. By joining [employer]’s FollowMyHealth portal, you will also be able to view your health information using other applications (apps) compatible with our system.

## 2023-01-07 NOTE — ED CDU PROVIDER DISPOSITION NOTE - CARE PROVIDER_API CALL
Tre Urrutia)  Critical Care Medicine; Internal Medicine; Pulmonary Disease; Sleep Medicine  [address] [place] [address]  Phone: [phone]  Fax: [phone]  Follow Up Time: 7-10 Days    Harry Rosenberg)  Cardiovascular Disease; Internal Medicine; Interventional Cardiology  [address] [place] [address]  Phone: [phone]  Fax: [phone]  Follow Up Time: 7-10 Days    your gastroenterologist,   Your gastroenterologist. in a few weeks.  Phone: (   )    -  Fax: (   )    -  Follow Up Time:

## 2023-01-07 NOTE — ED CDU PROVIDER SUBSEQUENT DAY NOTE - ATTENDING CONTRIBUTION TO CARE
Pt is in observation for ACS evaluation. CCTA done>>pt experienced shortness of breath relieved with albuterol. On exam pt has decreased bilateral sounds which   I noted earlier. Hx of tobacco use. Likely pt has some degree of COPD. Pt advised to follow up with pulmonary as out pt.

## 2023-01-07 NOTE — ED CDU PROVIDER SUBSEQUENT DAY NOTE - PROGRESS NOTE DETAILS
patient resting comfortably. awaiting CCTA. no new complaints. patient complaining of mild shortness of breath after returning from CCTA. minimal wheeze heard in left lower lobe, lungs clear to auscultation bilaterally otherwise. albuterol nebulizer ordered.

## 2023-01-07 NOTE — ED CDU PROVIDER DISPOSITION NOTE - CLINICAL COURSE
Pt presented with chest tightness. CCTA showed normal coronaries. Found to have a hiatal hernia. Advised smaller meals, ppi, follow up GI, avoidance of caffeine, chocolates and other aggravating foods. Advised to use albuterol as needed and pulmonary follow up.

## 2023-01-07 NOTE — ED CDU PROVIDER DISPOSITION NOTE - NSFOLLOWUPINSTRUCTIONS_ED_ALL_ED_FT
You have normal coronaries.    You were found to have a hiatal hernia which may be causing your symptoms    Avoid caffeine  Avoid chocolate  Eat smaller meals.     Follow up with your gastroenterologist and surgeon     You were also found to have bronchospasm.  You should use albuterol two puffs as necessary.    Take all reports to your doctor.

## 2023-01-07 NOTE — ED CDU PROVIDER DISPOSITION NOTE - PROVIDER TOKENS
PROVIDER:[TOKEN:[[id number]],FOLLOWUP:[7-10 Days]],PROVIDER:[TOKEN:[[id number]],FOLLOWUP:[7-10 Days]],FREE:[LAST:[your gastroenterologist],PHONE:[(   )    -],FAX:[(   )    -],ADDRESS:[Your gastroenterologist. in a few weeks.]]

## 2023-01-07 NOTE — ED ADULT NURSE REASSESSMENT NOTE - NS ED NURSE REASSESS COMMENT FT1
Pt assessed, A&Ox4, VSS. Pt denies pain/discomfort at this time. Pt maintained on cardiac monitor, awaiting CT. No acute distress noted. Safety and comfort measures maintained. Will continue to monitor

## 2023-01-07 NOTE — ED CDU PROVIDER DISPOSITION NOTE - CARE PROVIDERS DIRECT ADDRESSES
,DirectAddress_Unknown,[email or web address],DirectAddress_Unknown

## 2023-03-30 ENCOUNTER — APPOINTMENT (OUTPATIENT)
Dept: PULMONOLOGY | Facility: CLINIC | Age: 53
End: 2023-03-30
Payer: COMMERCIAL

## 2023-03-30 VITALS
SYSTOLIC BLOOD PRESSURE: 120 MMHG | HEART RATE: 87 BPM | OXYGEN SATURATION: 97 % | RESPIRATION RATE: 12 BRPM | WEIGHT: 168 LBS | DIASTOLIC BLOOD PRESSURE: 80 MMHG | HEIGHT: 60 IN | BODY MASS INDEX: 32.98 KG/M2

## 2023-03-30 PROCEDURE — G0296 VISIT TO DETERM LDCT ELIG: CPT

## 2023-03-30 PROCEDURE — 99204 OFFICE O/P NEW MOD 45 MIN: CPT | Mod: 25

## 2023-03-30 PROCEDURE — 94010 BREATHING CAPACITY TEST: CPT

## 2023-03-30 RX ORDER — FLUTICASONE FUROATE, UMECLIDINIUM BROMIDE AND VILANTEROL TRIFENATATE 100; 62.5; 25 UG/1; UG/1; UG/1
100-62.5-25 POWDER RESPIRATORY (INHALATION)
Qty: 1 | Refills: 5 | Status: ACTIVE | COMMUNITY
Start: 2023-03-30 | End: 1900-01-01

## 2023-03-30 NOTE — HISTORY OF PRESENT ILLNESS
[Doing Well] : doing well [Difficulty Breathing During Exertion] : stable dyspnea on exertion [Feelings Of Weakness On Exertion] : stable exercise intolerance [None] : None [Adherent] : the patient is adherent with ~his/her~ medication regimen [Goals--Doing Well] : the patient is doing well with ~his/her~ COPD goals [PFTs] : pulmonary function tests [Shortness of Breath] : Shortness of Breath

## 2023-03-31 RX ORDER — BUDESONIDE, GLYCOPYRROLATE, AND FORMOTEROL FUMARATE 160; 9; 4.8 UG/1; UG/1; UG/1
160-9-4.8 AEROSOL, METERED RESPIRATORY (INHALATION)
Qty: 3 | Refills: 3 | Status: ACTIVE | COMMUNITY
Start: 2023-03-31 | End: 1900-01-01

## 2023-04-03 RX ORDER — BUDESONIDE AND FORMOTEROL FUMARATE DIHYDRATE 160; 4.5 UG/1; UG/1
160-4.5 AEROSOL RESPIRATORY (INHALATION) TWICE DAILY
Qty: 1 | Refills: 3 | Status: ACTIVE | COMMUNITY
Start: 2023-04-03 | End: 1900-01-01

## 2023-04-24 ENCOUNTER — APPOINTMENT (OUTPATIENT)
Dept: PULMONOLOGY | Facility: HOSPITAL | Age: 53
End: 2023-04-24
Payer: COMMERCIAL

## 2023-04-24 ENCOUNTER — OUTPATIENT (OUTPATIENT)
Dept: OUTPATIENT SERVICES | Facility: HOSPITAL | Age: 53
LOS: 1 days | End: 2023-04-24
Payer: COMMERCIAL

## 2023-04-24 DIAGNOSIS — Z98.51 TUBAL LIGATION STATUS: Chronic | ICD-10-CM

## 2023-04-24 DIAGNOSIS — R06.02 SHORTNESS OF BREATH: ICD-10-CM

## 2023-04-24 DIAGNOSIS — Z90.710 ACQUIRED ABSENCE OF BOTH CERVIX AND UTERUS: Chronic | ICD-10-CM

## 2023-04-24 PROCEDURE — 94729 DIFFUSING CAPACITY: CPT | Mod: 26

## 2023-04-24 PROCEDURE — 94727 GAS DIL/WSHOT DETER LNG VOL: CPT | Mod: 26

## 2023-04-24 PROCEDURE — 94070 EVALUATION OF WHEEZING: CPT

## 2023-04-24 PROCEDURE — 94060 EVALUATION OF WHEEZING: CPT | Mod: 26

## 2023-04-24 PROCEDURE — 94729 DIFFUSING CAPACITY: CPT

## 2023-04-24 PROCEDURE — 94727 GAS DIL/WSHOT DETER LNG VOL: CPT

## 2023-04-25 DIAGNOSIS — R06.02 SHORTNESS OF BREATH: ICD-10-CM

## 2023-05-01 ENCOUNTER — APPOINTMENT (OUTPATIENT)
Dept: CARDIOTHORACIC SURGERY | Facility: CLINIC | Age: 53
End: 2023-05-01
Payer: COMMERCIAL

## 2023-05-01 VITALS — BODY MASS INDEX: 30.96 KG/M2 | HEIGHT: 61 IN | WEIGHT: 164 LBS

## 2023-05-01 DIAGNOSIS — Z87.891 PERSONAL HISTORY OF NICOTINE DEPENDENCE: ICD-10-CM

## 2023-05-01 DIAGNOSIS — Z80.1 FAMILY HISTORY OF MALIGNANT NEOPLASM OF TRACHEA, BRONCHUS AND LUNG: ICD-10-CM

## 2023-05-01 PROCEDURE — G0296 VISIT TO DETERM LDCT ELIG: CPT

## 2023-05-01 PROCEDURE — 71271 CT THORAX LUNG CANCER SCR C-: CPT

## 2023-05-01 NOTE — HISTORY OF PRESENT ILLNESS
[Former] : Former [>=20 Pack-Years] : Twenty pack years or greater smoking history: Yes [TextBox_13] : Referred by Dr. Dhillon\par \par Ms. GRACIA DIAZ  is a 52 year old woman with no PMH.\par \par She  was seen in the office by Dr. Dhillon for review of eligibility for, as well as, discussion of Low-Dose CT lung cancer screening program. Over the telephone today we reviewed and confirmed that the patient meets screening eligibility criteria:\par -Age: 52 year \par Smoking status:\par -Former smoker\par -Number of pack(s) per day: 1.5-2\par -Number of years smoked: 20\par -Number of pack years smokin+\par -Number of years since quitting smoking: 10 \par -Quit year: \par \par Ms. DIAZ denies any signs or symptoms of lung cancer including new cough, change in cough, hemoptysis and unintentional weight loss. \par \par Ms. DIAZ denies any personal history of lung cancer.  Denies any history of lung disease. Denies any history of occupational exposures.\par \par  [YearQuit] : 2013 [PacksperDay] : 1.5-2 [N_Years] : 20 [PacksperYear] : 30+

## 2023-05-01 NOTE — PLAN
[Age appropriate screenings] : Age appropriate screenings [Healthful dietary options] : healthful dietary options [FreeTextEntry1] : Plan:\par -Low Dose CT chest for lung cancer screening\par -Follow up with patient and her referring provider after her LDCT results have been reviewed by the multi-disciplinary clinical team\par -Encouraged continued smoking abstinence\par \par Should I Screen? tool utilized. 6 year risk of lung cancer is 0.9%. Patient wishes to proceed with screening.\par \par Engaged in shared decision making with Ms. GRACIA DIAZ . Answered all questions. She verbalized understanding and agreement. She knows to call back with any questions or concerns\par \par \par

## 2023-05-08 ENCOUNTER — OUTPATIENT (OUTPATIENT)
Dept: OUTPATIENT SERVICES | Facility: HOSPITAL | Age: 53
LOS: 1 days | End: 2023-05-08
Payer: COMMERCIAL

## 2023-05-08 ENCOUNTER — RESULT REVIEW (OUTPATIENT)
Age: 53
End: 2023-05-08

## 2023-05-08 ENCOUNTER — TRANSCRIPTION ENCOUNTER (OUTPATIENT)
Age: 53
End: 2023-05-08

## 2023-05-08 DIAGNOSIS — Z87.891 PERSONAL HISTORY OF NICOTINE DEPENDENCE: ICD-10-CM

## 2023-05-08 DIAGNOSIS — Z98.51 TUBAL LIGATION STATUS: Chronic | ICD-10-CM

## 2023-05-08 DIAGNOSIS — Z12.2 ENCOUNTER FOR SCREENING FOR MALIGNANT NEOPLASM OF RESPIRATORY ORGANS: ICD-10-CM

## 2023-05-08 DIAGNOSIS — Z90.710 ACQUIRED ABSENCE OF BOTH CERVIX AND UTERUS: Chronic | ICD-10-CM

## 2023-05-08 PROCEDURE — 71250 CT THORAX DX C-: CPT

## 2023-05-08 PROCEDURE — 71250 CT THORAX DX C-: CPT | Mod: 26

## 2023-05-09 DIAGNOSIS — Z87.891 PERSONAL HISTORY OF NICOTINE DEPENDENCE: ICD-10-CM

## 2023-05-09 DIAGNOSIS — Z12.2 ENCOUNTER FOR SCREENING FOR MALIGNANT NEOPLASM OF RESPIRATORY ORGANS: ICD-10-CM

## 2023-06-29 ENCOUNTER — APPOINTMENT (OUTPATIENT)
Dept: PULMONOLOGY | Facility: CLINIC | Age: 53
End: 2023-06-29
Payer: COMMERCIAL

## 2023-06-29 PROCEDURE — 99214 OFFICE O/P EST MOD 30 MIN: CPT

## 2023-06-29 RX ORDER — TIOTROPIUM BROMIDE INHALATION SPRAY 3.12 UG/1
2.5 SPRAY, METERED RESPIRATORY (INHALATION) DAILY
Qty: 1 | Refills: 5 | Status: ACTIVE | COMMUNITY
Start: 2023-06-29 | End: 1900-01-01

## 2023-06-29 NOTE — ASSESSMENT
[FreeTextEntry1] : Moderate COPD improved from severe  \par HO 30 PY smoking quit 2015\par Possible PEGGY

## 2023-06-29 NOTE — HISTORY OF PRESENT ILLNESS
[Doing Well] : doing well [Difficulty Breathing During Exertion] : improved dyspnea on exertion [Feelings Of Weakness On Exertion] : improved exercise intolerance [None] : None [Adherent] : the patient is adherent with ~his/her~ medication regimen [Goals--Doing Well] : the patient is doing well with ~his/her~ COPD goals [PFTs] : pulmonary function tests [Shortness of Breath] : Shortness of Breath

## 2023-09-21 ENCOUNTER — APPOINTMENT (OUTPATIENT)
Dept: PULMONOLOGY | Facility: CLINIC | Age: 53
End: 2023-09-21
Payer: COMMERCIAL

## 2023-09-21 ENCOUNTER — NON-APPOINTMENT (OUTPATIENT)
Age: 53
End: 2023-09-21

## 2023-09-21 VITALS
WEIGHT: 165 LBS | HEART RATE: 70 BPM | OXYGEN SATURATION: 96 % | DIASTOLIC BLOOD PRESSURE: 60 MMHG | RESPIRATION RATE: 12 BRPM | HEIGHT: 61 IN | BODY MASS INDEX: 31.15 KG/M2 | SYSTOLIC BLOOD PRESSURE: 100 MMHG

## 2023-09-21 DIAGNOSIS — J44.9 CHRONIC OBSTRUCTIVE PULMONARY DISEASE, UNSPECIFIED: ICD-10-CM

## 2023-09-21 DIAGNOSIS — R06.02 SHORTNESS OF BREATH: ICD-10-CM

## 2023-09-21 PROCEDURE — 99214 OFFICE O/P EST MOD 30 MIN: CPT | Mod: 25

## 2023-09-21 PROCEDURE — 94727 GAS DIL/WSHOT DETER LNG VOL: CPT

## 2023-09-21 PROCEDURE — 94010 BREATHING CAPACITY TEST: CPT

## 2023-09-21 PROCEDURE — 94729 DIFFUSING CAPACITY: CPT

## 2023-11-20 ENCOUNTER — RX RENEWAL (OUTPATIENT)
Age: 53
End: 2023-11-20

## 2023-11-22 RX ORDER — BUDESONIDE AND FORMOTEROL FUMARATE DIHYDRATE 160; 4.5 UG/1; UG/1
160-4.5 AEROSOL RESPIRATORY (INHALATION)
Qty: 1 | Refills: 3 | Status: ACTIVE | COMMUNITY
Start: 2023-11-22 | End: 1900-01-01

## 2024-04-18 ENCOUNTER — APPOINTMENT (OUTPATIENT)
Dept: PULMONOLOGY | Facility: CLINIC | Age: 54
End: 2024-04-18

## 2024-06-10 ENCOUNTER — APPOINTMENT (OUTPATIENT)
Dept: PULMONOLOGY | Facility: CLINIC | Age: 54
End: 2024-06-10

## 2024-12-02 ENCOUNTER — APPOINTMENT (OUTPATIENT)
Dept: PULMONOLOGY | Facility: CLINIC | Age: 54
End: 2024-12-02